# Patient Record
Sex: MALE | Race: BLACK OR AFRICAN AMERICAN | Employment: FULL TIME | URBAN - METROPOLITAN AREA
[De-identification: names, ages, dates, MRNs, and addresses within clinical notes are randomized per-mention and may not be internally consistent; named-entity substitution may affect disease eponyms.]

---

## 2018-07-09 ENCOUNTER — HOSPITAL ENCOUNTER (OUTPATIENT)
Dept: PHYSICAL THERAPY | Age: 23
Discharge: HOME | End: 2018-07-09
Payer: COMMERCIAL

## 2018-07-09 PROCEDURE — 97161 PT EVAL LOW COMPLEX 20 MIN: CPT

## 2018-07-09 PROCEDURE — 97110 THERAPEUTIC EXERCISES: CPT
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
## 2018-07-12 ENCOUNTER — HOSPITAL ENCOUNTER (OUTPATIENT)
Dept: PHYSICAL THERAPY | Age: 23
Discharge: HOME | End: 2018-07-12
Payer: COMMERCIAL

## 2018-07-12 PROCEDURE — 97110 THERAPEUTIC EXERCISES: CPT

## 2018-07-12 NOTE — PROGRESS NOTES
PHYSICAL THERAPY - DAILY TREATMENT NOTE    Patient Name: Jina Payne        Date: 2018  : 1995   YES Patient  Verified  Visit #:   2   of     Insurance: Payor: Vivek Conner / Plan: Jose Lind PPO / Product Type: PPO /      In time: 835 Out time: 035   Total Treatment Time: 40     Medicare Time Tracking (below)   Total Timed Codes (min):   1:1 Treatment Time:       TREATMENT AREA = Low back pain [M54.5]  Neck pain [M54.2]    SUBJECTIVE  Pain Level (on 0 to 10 scale):  7  / 10   Medication Changes/New allergies or changes in medical history, any new surgeries or procedures? NO    If yes, update Summary List   Subjective Functional Status/Changes:  []  No changes reported     Stiff. About the same. Heat helped last visit        OBJECTIVE    30 min Therapeutic Exercise:  [x]  See flow sheet   Rationale:      increase ROM and increase strength to improve the patients ability to perform ADLs with less pain         Modalities Rationale:     decrease pain and increase tissue extensibility to improve patient's ability to perform ADLs   min [] Estim, type/location:                                      []  att     []  unatt     []  w/US     []  w/ice    []  w/heat    min []  Mechanical Traction: type/lbs                   []  pro   []  sup   []  int   []  cont    []  before manual    []  after manual    min []  Ultrasound, settings/location:      min []  Iontophoresis w/ dexamethasone, location:                                               []  take home patch       []  in clinic   10 min []  Ice     []  Heat    location/position:     min []  Vasopneumatic Device, press/temp:     min []  Other:    [x] Skin assessment post-treatment (if applicable):    [x]  intact    [x]  redness- no adverse reaction     []redness  adverse reaction:         min Gait Training:    Rationale:        throughout Rx min Patient Education:  YES  Reviewed HEP   []  Progressed/Changed HEP based on:            Other Objective/Functional Measures:    Progressed HEP     Post Treatment Pain Level (on 0 to 10) scale:   6  / 10     ASSESSMENT  Assessment/Changes in Function:     Functional improvement:  Progressed HEP   Functional limitation:  pain      []  See Progress Note/Recertification   Patient will continue to benefit from skilled PT services to modify and progress therapeutic interventions, address functional mobility deficits, address ROM deficits, address strength deficits, analyze and address soft tissue restrictions and analyze and cue movement patterns to attain remaining goals.    Progress toward goals / Updated goals:    Progressed HEP     PLAN  [x]  Upgrade activities as tolerated YES Continue plan of care   []  Discharge due to :    []  Other:      Therapist: Zohra Kern, PT    Date: 7/12/2018 Time: 8:43 AM     Future Appointments  Date Time Provider John Cope   7/16/2018 8:30 AM Fabi Fairbanks V PTA REHAB CENTER AT [facility]   7/18/2018 9:00 AM Alda Houston PT REHAB CENTER AT [facility]   7/23/2018 8:30 AM Juana Brittle Jared Redwood, PTA REHAB CENTER AT [facility]   7/25/2018 9:00 AM Alda Houston PT REHAB CENTER AT [facility]   7/30/2018 8:30 AM Fabi Fairbanks V PTA REHAB CENTER AT [facility]

## 2018-07-17 ENCOUNTER — HOSPITAL ENCOUNTER (OUTPATIENT)
Dept: PHYSICAL THERAPY | Age: 23
Discharge: HOME | End: 2018-07-17
Payer: COMMERCIAL

## 2018-07-17 PROCEDURE — 97110 THERAPEUTIC EXERCISES: CPT

## 2018-07-17 NOTE — PROGRESS NOTES
PHYSICAL THERAPY - DAILY TREATMENT NOTE      Patient Name: Heather Madrid        Date: 2018  : 1995   YES Patient  Verified  Visit #:   3   of    Insurance: Payor: Elzbieta Screws / Plan: [place] [employer] [place]PO / Product Type: PPO /      In time: 7:00 Out time: 7:36   Total Treatment Time: 26       TREATMENT AREA = Low back pain [M54.5]  Neck pain [M54.2]    SUBJECTIVE    Pain Level (on 0 to 10 scale):  4-5  / 10   Medication Changes/New allergies or changes in medical history, any new surgeries or procedures? NO    If yes, update Summary List   Subjective Functional Status/Changes:  []  No changes reported     Only have LBP, not as bad today as it was last session       OBJECTIVE    26 min Therapeutic Exercise:  [x]  See flow sheet   Rationale:      increase ROM and increase strength to improve the patients ability to perform all LE ADL's     Modalities Rationale:     decrease pain to improve patient's ability to perform all LE ADL's      min [] Estim, type/location:                                      []  att     []  unatt     []  w/US     []  w/ice    []  w/heat    min []  Mechanical Traction: type/lbs                   []  pro   []  sup   []  int   []  cont    []  before manual    []  after manual    min []  Ultrasound, settings/location:      min []  Iontophoresis w/ dexamethasone, location:                                               []  take home patch       []  in clinic   10 min []  Ice     [x]  Heat    location/position: Neck and low back/supine    min []  Vasopneumatic Device, press/temp:     min []  Other:    [x] Skin assessment post-treatment (if applicable):    [x]  intact    [x]  redness- no adverse reaction     []redness  adverse reaction:      Throughout Tx min Patient Education:  YES  Reviewed HEP   []  Progressed/Changed HEP based on: Other Objective/Functional Measures:     Added dead bugs     Post Treatment Pain Level (on 0 to 10) scale:   4  / 10 ASSESSMENT    Assessment/Changes in Function:     No increased pain with progression of therex     []  See Progress Note/Recertification   Patient will continue to benefit from skilled PT services to modify and progress therapeutic interventions, address functional mobility deficits, address ROM deficits, address strength deficits, analyze and address soft tissue restrictions, analyze and cue movement patterns, analyze and modify body mechanics/ergonomics and assess and modify postural abnormalities to attain remaining goals.    Progress toward goals / Updated goals:    Continue strengthening to meet all goals     PLAN    [x]  Upgrade activities as tolerated YES Continue plan of care   []  Discharge due to :    []  Other:      Therapist: Toma Christine PT    Date: 7/17/2018 Time: 7:06 AM   Future Appointments  Date Time Provider John Cope   7/18/2018 9:00 AM Jose Angel Floyd, PT REHAB CENTER AT [facility]   7/23/2018 8:30 AM Gold Glover REH[facility] AT [facility]   7/25/2018 9:00 AM Jose Angel Floyd PT REHAB CENTER AT [facility]   7/30/2018 8:30 AM Gold Moore PTA REHAB CENTER AT [facility]

## 2018-07-18 ENCOUNTER — HOSPITAL ENCOUNTER (OUTPATIENT)
Dept: PHYSICAL THERAPY | Age: 23
Discharge: HOME | End: 2018-07-18
Payer: COMMERCIAL

## 2018-07-18 PROCEDURE — 97110 THERAPEUTIC EXERCISES: CPT

## 2018-07-18 NOTE — PROGRESS NOTES
PHYSICAL THERAPY - DAILY TREATMENT NOTE    Patient Name: Magaly Dillon        Date: 2018  : 1995   YES Patient  Verified  Visit #:   4     Insurance: Payor: Aurora Brooks / Plan: [place] [employer] [place]PO / Product Type: PPO /      In time: 855 Out time: 1000   Total Treatment Time: 65     Medicare Time Tracking (below)   Total Timed Codes (min):   1:1 Treatment Time:       TREATMENT AREA =  Low back pain [M54.5]  Neck pain [M54.2]    SUBJECTIVE  Pain Level (on 0 to 10 scale):  4  / 10   Medication Changes/New allergies or changes in medical history, any new surgeries or procedures? NO    If yes, update Summary List   Subjective Functional Status/Changes:  []  No changes reported     \"I'm doing better. I still feel pain in the center of my back. \"          OBJECTIVE  Modalities Rationale:     decrease pain and increase tissue extensibility to improve patient's ability to tolerate ADL's without increased pain   min [] Estim, type/location:                                      []  att     []  unatt     []  w/US     []  w/ice    []  w/heat    min []  Mechanical Traction: type/lbs                   []  pro   []  sup   []  int   []  cont    []  before manual    []  after manual    min []  Ultrasound, settings/location:      min []  Iontophoresis w/ dexamethasone, location:                                               []  take home patch       []  in clinic   10 min []  Ice     [x]  Heat    location/position: Supine to C/S and L/S    min []  Vasopneumatic Device, press/temp:     min []  Other:    [x] Skin assessment post-treatment (if applicable):    [x]  intact    [x]  redness- no adverse reaction     []redness  adverse reaction:        55 min Therapeutic Exercise:  [x]  See flow sheet   Rationale:      increase ROM and increase strength to improve the patients ability to perform ADL's and work related activities       X min Patient Education:  YES  Reviewed HEP   []  Progressed/Changed HEP based on: Ongoing throughout treatment session. Discussed proper L/S in sitting and body mechanics w/ ADL's     Other Objective/Functional Measures: Added scap retraction and extension with abd draw, wall trish w/ chin tuck, bike, bridging w/ abd draw, hip hinge. Post Treatment Pain Level (on 0 to 10) scale:   0  / 10     ASSESSMENT  Assessment/Changes in Function:     Significant improvement in pain post treatment session. Patient able to perform all therex without pain but required multiple vc's for technique. Pt tends to recruit UT w/ chin tucks and shoulder movement. Fatigued easily but without increased symptoms. Significant loss of muscle strength throughout scapular complex and back musculature. Would benefit from strong posterior therex program to resolve pain and prevent re injury when returning to workouts. []  See Progress Note/Recertification   Patient will continue to benefit from skilled PT services to modify and progress therapeutic interventions, address functional mobility deficits, address ROM deficits, address strength deficits, analyze and address soft tissue restrictions, analyze and cue movement patterns, analyze and modify body mechanics/ergonomics and assess and modify postural abnormalities to attain remaining goals.    Progress toward goals / Updated goals:    Ongoing progress towards all STG's     PLAN  [x]  Upgrade activities as tolerated YES Continue plan of care   []  Discharge due to :    []  Other:      Therapist: Lamin Lewis PT    Date: 7/18/2018 Time: 8:21 AM     Future Appointments  Date Time Provider John Cope   7/18/2018 9:00 AM Lamin Lewis PT [facility]   7/23/2018 8:30 AM Eliz Syed [facility]   7/25/2018 9:00 AM Lamin Lewis PT [facility] AT [facility]   7/30/2018 8:30 AM Eliz Duffy PTA REHAB CENTER AT [facility]

## 2018-07-23 ENCOUNTER — HOSPITAL ENCOUNTER (OUTPATIENT)
Dept: PHYSICAL THERAPY | Age: 23
End: 2018-07-23
Payer: COMMERCIAL

## 2018-07-23 ENCOUNTER — HOSPITAL ENCOUNTER (OUTPATIENT)
Dept: PHYSICAL THERAPY | Age: 23
Discharge: HOME | End: 2018-07-23
Payer: COMMERCIAL

## 2018-07-23 PROCEDURE — 97110 THERAPEUTIC EXERCISES: CPT

## 2018-07-23 NOTE — PROGRESS NOTES
PHYSICAL THERAPY - DAILY TREATMENT NOTE  -    Patient Name: Jing Perez        Date: 2018  : 1995   YES Patient  Verified  Visit #:     Insurance: Payor: Ronda Butt / Plan: [place] [employer] [place] / Product Type: PPO /      In time: 6:05 Out time: 6:45   Total Treatment Time: 35       TREATMENT AREA = Low back pain [M54.5]  Neck pain [M54.2]    SUBJECTIVE    Pain Level (on 0 to 10 scale):  3- 10   Medication Changes/New allergies or changes in medical history, any new surgeries or procedures? NO    If yes, update Summary List   Subjective Functional Status/Changes:  []  No changes reported     Neck feels good, still have some pain in low back       OBJECTIVE    35 min Therapeutic Exercise:  [x]  See flow sheet   Rationale:      increase strength to improve the patients ability to perform all ADL's     Modalities Rationale:     decrease pain to improve patient's ability to perform all ADL's      min [] Estim, type/location:                                      []  att     []  unatt     []  w/US     []  w/ice    []  w/heat    min []  Mechanical Traction: type/lbs                   []  pro   []  sup   []  int   []  cont    []  before manual    []  after manual    min []  Ultrasound, settings/location:      min []  Iontophoresis w/ dexamethasone, location:                                               []  take home patch       []  in clinic   5 min []  Ice     [x]  Heat    location/position: Neck and low back/supine    min []  Vasopneumatic Device, press/temp:     min []  Other:    [x] Skin assessment post-treatment (if applicable):    [x]  intact    [x]  redness- no adverse reaction     []redness  adverse reaction:      Throughout Tx min Patient Education:  YES  Reviewed HEP   []  Progressed/Changed HEP based on: Other Objective/Functional Measures:     Tolerated and completed all therex     Post Treatment Pain Level (on 0 to 10) scale:    10     ASSESSMENT    Assessment/Changes in Function:     No increased pain during therex     []  See Progress Note/Recertification   Patient will continue to benefit from skilled PT services to modify and progress therapeutic interventions, address functional mobility deficits, address ROM deficits, address strength deficits, analyze and address soft tissue restrictions, analyze and cue movement patterns, analyze and modify body mechanics/ergonomics and assess and modify postural abnormalities to attain remaining goals.    Progress toward goals / Updated goals:    Continue strengthening to meet all goals     PLAN    [x]  Upgrade activities as tolerated YES Continue plan of care   []  Discharge due to :    []  Other:      Therapist: Julia Abraham PT    Date: 7/23/2018 Time: 6:41 PM   Future Appointments  Date Time Provider John Cope   7/25/2018 6:00 PM Kaleigh Chaney RE[facility] AT [facility]   8/1/2018 6:00 PM [facility] PT [place] [id number] REHAB CENTER AT [facility]   8/3/2018 7:30 AM Concha HANCOCK PTA REHAB CENTER AT [facility]

## 2018-07-25 ENCOUNTER — APPOINTMENT (OUTPATIENT)
Dept: PHYSICAL THERAPY | Age: 23
End: 2018-07-25
Payer: COMMERCIAL

## 2018-07-27 ENCOUNTER — HOSPITAL ENCOUNTER (OUTPATIENT)
Dept: PHYSICAL THERAPY | Age: 23
Discharge: HOME | End: 2018-07-27
Payer: COMMERCIAL

## 2018-07-27 PROCEDURE — 97110 THERAPEUTIC EXERCISES: CPT

## 2018-07-27 NOTE — PROGRESS NOTES
PHYSICAL THERAPY - DAILY TREATMENT NOTE   Patient Name: Anisa Lawton        Date: 2018 : 1995   YES Patient  Verified Visit #:     Insurance: Payor: Justyna Hilliard / Plan: [place] [employer] PPO / Product Type: PPO / In time: 9:05 Out time: 9:55 Total Treatment Time: 50 TREATMENT AREA = Low back pain [M54.5] Neck pain [M54.2] SUBJECTIVE Pain Level (on 0 to 10 scale):  3  10 Medication Changes/New allergies or changes in medical history, any new surgeries or procedures? NO    If yes, update Summary List  
Subjective Functional Status/Changes:  []  No changes reported \"Pain with lifting and bending. Sleeping okay. No neck pain. \" OBJECTIVE Modalities Rationale:     decrease pain to improve patient's ability to perform all ADL's    
 min [] Estim, type/location:    
                                 []  att     []  unatt     []  w/US     []  w/ice    []  w/heat  
 min []  Mechanical Traction: type/lbs   
               []  pro   []  sup   []  int   []  cont    []  before manual    []  after manual  
 min []  Ultrasound, settings/location:    
 min []  Iontophoresis w/ dexamethasone, location:   
                                           []  take home patch       []  in clinic  
10 min [x]  Ice     []  Heat    location/position: To L/S  
 min []  Vasopneumatic Device, press/temp:   
 min []  Other:   
[x] Skin assessment post-treatment (if applicable):   
[x]  intact    [x]  redness- no adverse reaction    
[]redness  adverse reaction:   
 
40 min Therapeutic Exercise:  [x]  See flow sheet Rationale:      increase strength to improve the patients ability to perform all ADL's X min Patient Education:  YES  Reviewed HEP []  Progressed/Changed HEP based on: Other Objective/Functional Measures: Added TA draw with SLR. Pain with bridge. Pt wanted trial of ice over [address]; prefers .   
  
Post Treatment Pain Level (on 0 to 10) scale:   3/ 10 ASSESSMENT Assessment/Changes in Function:  
 
Functional Limitations: lifting and bending Functional Improvements: Driving forwork 
  
[]  See Progress Note/Recertification Patient will continue to benefit from skilled PT services to modify and progress therapeutic interventions, address functional mobility deficits, address ROM deficits, address strength deficits, analyze and address soft tissue restrictions, analyze and cue movement patterns, analyze and modify body mechanics/ergonomics and assess and modify postural abnormalities to attain remaining goals. Progress toward goals / Updated goals: 
 
Continue strengthening to meet all goals PLAN [x]  Upgrade activities as tolerated YES Continue plan of care  
[]  Discharge due to :   
[]  Other:   
 
Therapist: Aliza Matos PTA Date: 7/27/2018 Time: 6:41 PM  
 
Future Appointments Date Time Provider John Cope 7/27/2018 9:00 AM Mary Rodríguez PTA REH[facility]ENTER AT [facility]  
8/1/2018 6:00 PM [facility] PT [place] [address] [facility] AT [facility]  
8/3/2018 7:30 AM Cameron HANCOCK PTA [facility] AT [facility]

## 2018-07-30 ENCOUNTER — APPOINTMENT (OUTPATIENT)
Dept: PHYSICAL THERAPY | Age: 23
End: 2018-07-30
Payer: COMMERCIAL

## 2018-08-01 ENCOUNTER — HOSPITAL ENCOUNTER (OUTPATIENT)
Dept: PHYSICAL THERAPY | Age: 23
Discharge: HOME | End: 2018-08-01
Payer: COMMERCIAL

## 2018-08-01 PROCEDURE — 97110 THERAPEUTIC EXERCISES: CPT

## 2018-08-01 NOTE — PROGRESS NOTES
PHYSICAL THERAPY - DAILY TREATMENT NOTE Patient Name: Demetri Risk        Date: 2018 : 1995    Patient  Verified: Ligia Edmonds Visit #:     Insurance: Payor: Cordell Travis / Plan: [place] [employer] PPO / Product Type: PPO / In time: 6:05 Out time: 7:00 Total Treatment Time: 54 Medicare Time Tracking (below) Total Timed Codes (min):  - 1:1 Treatment Time:  -  
 
TREATMENT AREA/ DIAGNOSIS = Low back pain [M54.5] Neck pain [M54.2] SUBJECTIVE Pain Level (on 0 to 10 scale):  3  / 10 Medication Changes/New allergies or changes in medical history, any new surgeries or procedures? NO    If yes, update Summary List  
Subjective Functional Status/Changes:  []  No changes reported Pt reports that he is still having some pain when he is bending forward OBJECTIVE 55 min Therapeutic Exercise:  [x]  See flow sheet Rationale:      increase strength, improve coordination and improve balance to improve the patients ability to perform ADLs without pain Throughout treatment session min Patient Education:  Yes    [x] Reviewed HEP []  Progressed/Changed HEP based on: Other Objective/Functional Measures: 
 
Pt has full ROM of his L/s. Performed 90/90 hs tretch instead of straight leg raise d/t straight leg raise causing neural tension in the LEs Post Treatment Pain Level (on 0 to 10) scale:   2-3  / 10 ASSESSMENT Assessment/Changes in Function:  
 
Pt required verbal and tactile cueing for proper core activation during stabilization exercises. []  See Progress Note/Recertification Patient will continue to benefit from skilled PT services to modify and progress therapeutic interventions, address functional mobility deficits, address strength deficits and analyze and modify body mechanics/ergonomics to attain remaining goals. Progress toward goals / Updated goals: 
 
Continue with current treatment plan PLAN [x]  Upgrade activities as tolerated YES Continue plan of care  
[]  Discharge due to :   
[]  Other:   
 
Therapist: Katherin Turner Date: 8/1/2018 Time: 6:14 PM  
 
  
Future Appointments Date Time Provider John Cope 8/3/2018 7:30 AM Sushila HANCOCK PTA REHAB [facility] AT [facility]

## 2018-08-13 ENCOUNTER — HOSPITAL ENCOUNTER (OUTPATIENT)
Dept: PHYSICAL THERAPY | Age: 23
End: 2018-08-13
Payer: COMMERCIAL

## 2018-08-17 ENCOUNTER — HOSPITAL ENCOUNTER (OUTPATIENT)
Dept: PHYSICAL THERAPY | Age: 23
Discharge: HOME | End: 2018-08-17
Payer: COMMERCIAL

## 2018-08-17 PROCEDURE — 97110 THERAPEUTIC EXERCISES: CPT

## 2018-08-17 NOTE — PROGRESS NOTES
[address] PHYSICAL THERAPY AT [address], [address] [place] [address], [address], [address] [place] [address]  Phone: [phone]  Fax: [phone]  PROGRESS NOTE  Patient Name: Alberto Marie : 1995   Treatment/Medical Diagnosis: Low back pain [M54.5]  Neck pain [M54.2]   Referral Source: Aga Sutherland, *     Date of Initial Visit: 18 Attended Visits: 8 Missed Visits: 4     SUMMARY OF TREATMENT   Alberto Marie has been seen at our clinic 2x/wk for a total of 8visits. Pt treatment has consisted of therapeutic exercise for deep cervical flexor and spinal stabilizer mm strengthening, postural correction, and functional squat education. CURRENT STATUS   Pt has had a good response to physical therapy treatment. Pt reports 85% overall improvement since [facility]. Reaching, leaning over, turning head, and driving are easier to do. Continued pain/diffiuclty with reaching and leaning over. Pain at best: 2/10, pain at worst: 3/10. Bridge: 100%   Previous Goals:  1. Pt will improve FOTO score to >/= 77 to demo a significant improvement in functional activity tolerance. 2. Pt will demonstrate bridge >/= 75% for improved axial stability with standing/walking. 3. Pt will note pain less than or equal to 2/10 to promote return to work activities. Prior Level/Current Level:  1) Prior Level: 56   Current Level: 67   Goal Met? no  2) Prior Level: NT   Current Level: 100%   Goal Met? yes  3) Prior Level: 7/10   Current Level: 3/10   Goal Met? progressing    New Goals to be achieved in __4__  treatments:  1. Pt will improve FOTO score to >/= 77 to demo a significant improvement in functional activity tolerance. 2. Pt will note pain less than or equal to 2/10 to promote return to work activities. RECOMMENDATIONS   Continue therapy 2x/week for another 4 treatment session. If you have any questions/comments please contact us directly at [phone].    Thank you for allowing us to assist in the care of your patient. Therapist Signature: Adalberto Romberg, PT Date: 8/17/2018     Time: 8:06 AM   NOTE TO PHYSICIAN:  PLEASE COMPLETE THE ORDERS BELOW AND FAX TO   [facility]: [phone]. If you are unable to process this request in 24 hours please contact our office: [phone].    ___ I have read the above report and request that my patient continue as recommended.   ___ I have read the above report and request that my patient continue therapy with the following changes/special instructions:_________________________________________________________   ___ I have read the above report and request that my patient be discharged from therapy.

## 2018-08-17 NOTE — PROGRESS NOTES
PHYSICAL THERAPY - DAILY TREATMENT NOTE      Patient Name: Jovanny Sotelo        Date: 2018  : 1995   YES Patient  Verified  Visit #:     Insurance: Payor: Olya Gooden / Plan: Alexa Conception PPO / Product Type: PPO /      In time: 8:05 Out time: 8:50   Total Treatment Time: 45       TREATMENT AREA = Low back pain [M54.5]  Neck pain [M54.2]    SUBJECTIVE    Pain Level (on 0 to 10 scale):  0  / 10   Medication Changes/New allergies or changes in medical history, any new surgeries or procedures? NO    If yes, update Summary List   Subjective Functional Status/Changes:  []  No changes reported     See progress report       OBJECTIVE    45 min Therapeutic Exercise:  [x]  See flow sheet   Rationale:      increase ROM and increase strength to improve the patients ability to perform all ADL's     Throughout Tx min Patient Education:  YES  Reviewed HEP   []  Progressed/Changed HEP based on: Other Objective/Functional Measures:    See progress report     Post Treatment Pain Level (on 0 to 10) scale:   0  / 10     ASSESSMENT    Assessment/Changes in Function:     See progress report     []  See Progress Note/Recertification   Patient will continue to benefit from skilled PT services to modify and progress therapeutic interventions, address functional mobility deficits, address strength deficits, analyze and cue movement patterns, analyze and modify body mechanics/ergonomics and assess and modify postural abnormalities to attain remaining goals. Progress toward goals / Updated goals:    See progress report     PLAN    [x]  Upgrade activities as tolerated YES Continue plan of care   []  Discharge due to :    []  Other:      Therapist: Jadiel Clark, PT    Date: 2018 Time: 8:23 AM   No future appointments.

## 2018-08-20 ENCOUNTER — HOSPITAL ENCOUNTER (OUTPATIENT)
Dept: PHYSICAL THERAPY | Age: 23
Discharge: HOME | End: 2018-08-20
Payer: COMMERCIAL

## 2018-08-20 PROCEDURE — 97110 THERAPEUTIC EXERCISES: CPT

## 2018-08-20 NOTE — PROGRESS NOTES
PHYSICAL THERAPY - DAILY TREATMENT NOTE      Patient Name: Hai Salcedo        Date: 2018  : 1995   YES Patient  Verified  Visit #:     Insurance: Payor: Katie Comment / Plan: [place] [employer] PPO / Product Type: PPO /      In time: 6:00 Out time: 6:38   Total Treatment Time: 38       TREATMENT AREA = Low back pain [M54.5]  Neck pain [M54.2]    SUBJECTIVE    Pain Level (on 0 to 10 scale):  0  / 10   Medication Changes/New allergies or changes in medical history, any new surgeries or procedures? NO    If yes, update Summary List   Subjective Functional Status/Changes:  []  No changes reported     No pain today       OBJECTIVE    38 min Therapeutic Exercise:  [x]  See flow sheet   Rationale:      increase ROM and increase strength to improve the patients ability to perform all LE ADL's    Throughout Tx min Patient Education:  YES  Reviewed HEP   []  Progressed/Changed HEP based on: Other Objective/Functional Measures: Tolerated and completed all therex     Post Treatment Pain Level (on 0 to 10) scale:   0  / 10     ASSESSMENT    Assessment/Changes in Function:     No increased pain during therex       []  See Progress Note/Recertification   Patient will continue to benefit from skilled PT services to modify and progress therapeutic interventions, address functional mobility deficits, address strength deficits, analyze and cue movement patterns, analyze and modify body mechanics/ergonomics and assess and modify postural abnormalities to attain remaining goals.    Progress toward goals / Updated goals:    D/C PT next visit if pain-free     PLAN    [x]  Upgrade activities as tolerated YES Continue plan of care   []  Discharge due to :    []  Other:      Therapist: Mago Viveros PT    Date: 2018 Time: 6:03 PM   Future Appointments  Date Time Provider John Cope   2018 8:00 AM Gladys HANCOCK PTA REHAB [facility] AT [facility]

## 2018-09-24 NOTE — PROGRESS NOTES
Margarita Suarezkibautista Germain [facility] PHYSICAL THERAPY AT [address] [facility], [address] [place] [address], [address] [place] [address], [address] [place] [address]  Phone: [phone]  Fax: [phone] DISCHARGE SUMMARY Patient Name: Jocy Joel : 1995 Treatment/Medical Diagnosis: Low back pain [M54.5] Neck pain [M54.2] Referral Source: Yolanda Emery, * Date of Initial Visit: 18 Attended Visits: 9 Missed Visits: 5 SUMMARY OF TREATMENT Jocy Joel has been seen at our clinic 2x/wk for a total of 9 visits. Pt treatment has consisted of therapeutic exercise for deep spinal stabilizer, deep cervical flexor, and pelvic girdle mm strengthening, postural correction, functional squat education, and manual therapy. CURRENT STATUS Unknown secondary to Pt non-compliance with PT attendance GOAL STATUS Unable to formally reassess progress toward goals secondary to pt non-compliance with continued PT attendance>1 month (last visit 18). RECOMMENDATIONS Discontinue therapy due to lack of attendance or compliance. If you have any questions/comments please contact us directly at [phone]. Thank you for allowing us to assist in the care of your patient. Therapist Signature: Sharla Landau, PT Date: 18   Time: 3:40 PM

## 2019-04-29 NOTE — PROGRESS NOTES
PHYSICAL THERAPY - DAILY TREATMENT NOTE    Patient Name: Meka Mcmullen        Date: 2018  : 1995   yes Patient  Verified  Visit #:   1     Insurance: Payor: BLUE CROSS MEDICAID / Plan: Joshua Lagunas / Product Type: Managed Care Medicaid /      In time: 8:05 Out time: 8:55   Total Treatment Time: 50     Medicare/BCBS Time Tracking (below)   Total Timed Codes (min):  n/a 1:1 Treatment Time:  n/a     TREATMENT AREA =  Low back pain [M54.5]  Neck pain [M54.2]    SUBJECTIVE  Pain Level (on 0 to 10 scale):  7  / 10   Medication Changes/New allergies or changes in medical history, any new surgeries or procedures?    no  If yes, update Summary List   Subjective Functional Status/Changes:  []  No changes reported     See POC          OBJECTIVE    See exam on chart for details on objective findings      Modalities Rationale:     decrease pain and increase tissue extensibility to improve patient's ability to perform functional mobility/ADLs and attain goals.    min [] Estim, type/location:                                      []  att     []  unatt     []  w/US     []  w/ice    []  w/heat    min []  Mechanical Traction: type/lbs                   []  pro   []  sup   []  int   []  cont    []  before manual    []  after manual    min []  Ultrasound, settings/location:      min []  Iontophoresis w/ dexamethasone, location:                                               []  take home patch       []  in clinic   10 min []  Ice     [x]  Heat    location/position: Neck and low back; reclined with LE wedge    min []  Vasopneumatic Device, press/temp:     min []  Other:    [x] Skin assessment post-treatment (if applicable):    [x]  intact    []  redness- no adverse reaction     []redness  adverse reaction:        10 min Therapeutic Exercise:  [x]  See flow sheet   Rationale:      increase ROM and increase strength to improve the patients ability to perform functional mobility/ADLs and attain goals.          min Patient Education:  yes  Reviewed HEP   []  Progressed/Changed HEP based on: Other Objective/Functional Measures:    See POC     Post Treatment Pain Level (on 0 to 10) scale:   6  / 10     ASSESSMENT  Assessment/Changes in Function:     See POC     []  See Progress Note/Recertification   Patient will continue to benefit from skilled PT services to modify and progress therapeutic interventions, address functional mobility deficits, address ROM deficits, address strength deficits and analyze and address soft tissue restrictions to attain remaining goals. Progress toward goals / Updated goals:    See POC     PLAN  []  Upgrade activities as tolerated yes Continue plan of care   []  Discharge due to :    []  Other:      Therapist: Gerardo Valdez.  Gardenia Alex, PT    Date: 7/9/2018 Time: 7:36 AM     Future Appointments  Date Time Provider John Cope   7/12/2018 8:30 AM Rosa Smith PT REHAB CENTER AT [facility]   7/16/2018 8:30 AM Silas Theodore Smaller REHAB CENTER AT [facility]   7/18/2018 9:00 AM Charito Donis PT REHAB CENTER AT [facility]   7/23/2018 8:30 AM Ki Morel PTA REHAB CENTER AT [facility]   7/25/2018 9:00 AM Charito Donis PT REHAB CENTER AT [facility]   7/30/2018 8:30 AM Hien Pastor V PTA REHAB CENTER AT [facility] day(s)/3

## 2022-04-27 ENCOUNTER — HOSPITAL ENCOUNTER (OUTPATIENT)
Dept: PHYSICAL THERAPY | Age: 27
Discharge: HOME | End: 2022-04-27
Payer: COMMERCIAL

## 2022-04-27 PROCEDURE — 97162 PT EVAL MOD COMPLEX 30 MIN: CPT
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
## 2022-04-27 NOTE — PROGRESS NOTES
PHYSICAL THERAPY - DAILY TREATMENT NOTE     Patient Name: Lance Santana        Date: 2022  : 1995   YES Patient  Verified  Visit #:      12  Insurance: Payor: Rosalia Syed / Plan: [place] [employer] [place] / Product Type: PPO /      In time: 11:24 AM Out time: 12:23 PM   Total Treatment Time: 59 min. Medicare/[employer] Time Tracking (below)   Total Timed Codes (min):  NA 1:1 Treatment Time:  NA     TREATMENT AREA =  Pain in left leg [M79.605]    SUBJECTIVE    Pain Level (on 0 to 10 scale):  3-4 / 10   Medication Changes/New allergies or changes in medical history, any new surgeries or procedures? NO    If yes, update Summary List   Subjective Functional Status/Changes:  []  No changes reported     See POC         OBJECTIVE  Modalities Rationale:     decrease edema, decrease inflammation, decrease pain, increase tissue extensibility and increase muscle contraction/control to improve patient's ability to transfer, stand, walk, community mobility, ADLs, work, household chores, sleep, and recreation/fitness witih less/no pain.    min [] Estim, type/location:                                      []  att     []  unatt     []  w/US     []  w/ice    []  w/heat    min []  Mechanical Traction: type/lbs                   []  pro   []  sup   []  int   []  cont    []  before manual    []  after manual    min []  Ultrasound, settings/location:      min []  Iontophoresis w/ dexamethasone, location:                                               []  take home patch       []  in clinic    min []  Ice     []  Heat    location/position:     min []  Vasopneumatic Device, press/temp:     min []  Other:    [] Skin assessment post-treatment (if applicable):    []  intact    []  redness- no adverse reaction     []redness - adverse reaction:      15 min Therapeutic Exercise:  [x]  See flow sheet   Rationale:      increase ROM and increase strength to improve the patients ability to transfer, stand, walk, community mobility, ADLs, work, household chores, sleep, and recreation/fitness witih less/no pain. 0 min Therapeutic Activity: [x]  See flow sheet   Rationale:      increase ROM, increase strength, improve coordination, improve balance and increase proprioception to improve the patients ability to transfer, stand, walk, community mobility, ADLs, work, household chores, sleep, and recreation/fitness witih less/no pain. 0 min Neuromuscular Re-ed: [x]  See flow sheet   Rationale:      increase strength, improve coordination, improve balance and increase proprioception to improve the patients ability to transfer, stand, walk, community mobility, ADLs, work, household chores, sleep, and recreation/fitness witih less/no pain. 0 min Manual Therapy: Technique:      [] S/DTM []IASTM []PROM [] Passive Stretching   []manual TPR    []Jt manipulation:Gr I [] II []  III [] IV[] V[]  Treatment Area:     Rationale:      decrease pain, increase ROM, increase tissue extensibility, decrease trigger points and increase postural awareness to improve patient's ability to transfer, stand, walk, community mobility, ADLs, work, household chores, sleep, and recreation/fitness witih less/no pain. The manual therapy interventions were performed at a separate and distinct time from the therapeutic activities interventions. 0 min Gait Training:  ___ feet with ___ device on level surfaces with ___ level of assistance   Rationale: To improve ambulation safety and efficiency in order to improve patient's ability to safely ambulate at home for self care. 0 min Self Care:    Rationale:    increase ROM, increase strength, improve coordination, improve balance and increase proprioception to improve the patients ability to transfer, stand, walk, community mobility, ADLs, work, household chores, sleep, and recreation/fitness witih less/no pain.     Billed With/As:   [x] TE   [x] TA   [x] Neuro   [x] Self Care Patient Education: [x] Review HEP    [x] Progressed/Changed HEP based on:   [x] positioning   [x] body mechanics   [] transfers   [] heat/ice application    [x] other:  Patient instructed in and briefly performed beginning HEP. Patient given handouts with pictures and written directions for HEP; copies of handouts placed in patient's chart. Other Objective/Functional Measures:    See POC     Post Treatment Pain Level (on 0 to 10) scale:   ? / 10--Not Reassessed. ASSESSMENT    Assessment/Changes in Function:     See POC     []  See Progress Note/Recertification   Patient will continue to benefit from skilled PT services to modify and progress therapeutic interventions, address functional mobility deficits, address ROM deficits, address strength deficits, analyze and address soft tissue restrictions, analyze and cue movement patterns, analyze and modify body mechanics/ergonomics, assess and modify postural abnormalities and instruct in home and community integration to attain remaining goals.       Progress toward goals / Updated goals:    See POC       PLAN    [x]  Upgrade activities as tolerated YES Continue plan of care   []  Discharge due to :    []  Other:      Therapist: Hermes Modi PT    Date: 4/27/2022 Time: 12:36 PM     Future Appointments   Date Time Provider John Cope   5/2/2022 10:30 AM Emily Kline PT [facility]   5/4/2022 10:30 AM Emily Kline PT [facility]

## 2022-05-02 ENCOUNTER — HOSPITAL ENCOUNTER (OUTPATIENT)
Dept: PHYSICAL THERAPY | Age: 27
Discharge: HOME | End: 2022-05-02
Payer: COMMERCIAL

## 2022-05-02 PROCEDURE — 97110 THERAPEUTIC EXERCISES: CPT

## 2022-05-02 PROCEDURE — 97035 APP MDLTY 1+ULTRASOUND EA 15: CPT

## 2022-05-02 PROCEDURE — 97530 THERAPEUTIC ACTIVITIES: CPT

## 2022-05-02 NOTE — PROGRESS NOTES
PHYSICAL THERAPY - DAILY TREATMENT NOTE      Patient Name: Chaparro Gibbs                         Date: 2022  : 1995                                    YES Patient  Verified  Visit #:                     Insurance: Payor: Prashant Madrid / Plan: [place] [employer] [place] / Product Type: PPO /       In time: 1:12 PM Out time: 2:25 PM   Total Treatment Time: 73 min.          Medicare/[employer] Time Tracking (below)   Total Timed Codes (min):  NA 1:1 Treatment Time:  NA      TREATMENT AREA =  Pain in left leg [M79.605]     SUBJECTIVE     Pain Level (on 0 to 10 scale):  6 / 10   Medication Changes/New allergies or changes in medical history, any new surgeries or procedures? NO    If yes, update Summary List   Subjective Functional Status/Changes:  []? No changes reported      Pt reports being able to do HEP items from handouts and no problems/questions related to HEP.   Left distal posterior thigh and wound areas . Pt denies drainage from wound sites and he denies signs/symptoms of infection or DVT/PE.         OBJECTIVE  Modalities Rationale:     decrease edema, decrease inflammation, decrease pain, increase tissue extensibility and increase muscle contraction/control to improve patient's ability to transfer, stand, walk, community mobility, ADLs, work, household chores, sleep, and recreation/fitness witih less/no pain.                min []? Estim, type/location:                                                            []?  att     []?  unatt     []?  w/US     []?  w/ice    []?  w/heat     min []? Mechanical Traction: type/lbs                    []?  pro   []?  sup   []? int   []?  cont    []? before manual    []? after manual   8  min [x]? Ultrasound, settings/location:  1.8 W/cm2 @ 1 MHz 100% to left distal posterior thigh/hamstrings in prone.      min []? Iontophoresis w/ dexamethasone, location:                                                []?  take home patch       []?   in clinic  10/10 min [x]? Ice     [x]? Heat    location/position: Large MHP to posterior L thigh and knee pre-Tx/prone. CP to L posterior thigh post-Tx/prone.      min []? Vasopneumatic Device, press/temp:       min []? Other:     [x]? Skin assessment post-treatment (if applicable):    [x]? intact    []? redness- no adverse reaction     []? redness  adverse reaction:       30 min Therapeutic Exercise:  [x]? See flow sheet   Rationale:      increase ROM and increase strength to improve the patients ability to transfer, stand, walk, community mobility, ADLs, work, household chores, sleep, and recreation/fitness witih less/no pain.     15 min Therapeutic Activity: [x]? See flow sheet   Rationale:      increase ROM, increase strength, improve coordination, improve balance and increase proprioception to improve the patients ability to transfer, stand, walk, community mobility, ADLs, work, household chores, sleep, and recreation/fitness witih less/no pain.      0 min Neuromuscular Re-ed: [x]? See flow sheet   Rationale:      increase strength, improve coordination, improve balance and increase proprioception to improve the patients ability to transfer, stand, walk, community mobility, ADLs, work, household chores, sleep, and recreation/fitness witih less/no pain.      0 min Manual Therapy: Technique:      []? S/DTM []? IASTM []? PROM []? Passive Stretching   []?manual TPR    []? Jt manipulation:Gr I []? II []? III []? IV[]? V[]? Treatment Area:     Rationale:      decrease pain, increase ROM, increase tissue extensibility, decrease trigger points and increase postural awareness to improve patient's ability to transfer, stand, walk, community mobility, ADLs, work, household chores, sleep, and recreation/fitness witih less/no pain.   The manual therapy interventions were performed at a separate and distinct time from the therapeutic activities interventions.     0 min Gait Training:  ___ feet with ___ device on level surfaces with ___ level of assistance   Rationale: To improve ambulation safety and efficiency in order to improve patient's ability to safely ambulate at home for self care.     0 min Self Care:     Rationale:    increase ROM, increase strength, improve coordination, improve balance and increase proprioception to improve the patients ability to transfer, stand, walk, community mobility, ADLs, work, household chores, sleep, and recreation/fitness witih less/no pain.     Billed With/As:   [x]? TE   [x]? TA   [x]? Neuro   [x]? Self Care Patient Education: [x]? Review HEP    [x]? Progressed/Changed HEP based on:   [x]? positioning   [x]? body mechanics   []? transfers   []? heat/ice application    [x]? other:  Patient instructed in and briefly performed new HEP items. Pt given handouts with pictures and written directions for new HEP items and copies placed in patient's chart.             Other Objective/Functional Measures:     Pt ambulates into PT carrying crutches and appears to have full L knee extension in standing and in prone.      Post Treatment Pain Level (on 0 to 10) scale:   5 / 10      ASSESSMENT    Assessment/Changes in Function:      Good performance of all activities during PT session and without c/o increased pain; improving HS flexibility. []  See Progress Note/Recertification   Patient will continue to benefit from skilled PT services to modify and progress therapeutic interventions, address functional mobility deficits, address ROM deficits, address strength deficits, analyze and address soft tissue restrictions, analyze and cue movement patterns, analyze and modify body mechanics/ergonomics, assess and modify postural abnormalities and instruct in home and community integration to attain remaining goals.       Progress toward goals / Updated goals:    Good Progress to    [x] STG    [] LTG  1, 3, and 4 as shown by patient reporting and by observed motions and performance of activities during the PT session. PLAN           [x]? Upgrade activities as tolerated YES Continue plan of care   []? Discharge due to :     []?   Other:              Therapist: KEITH Acharya     Date: 05/02/2022 Time: 10:29 AM     Future Appointments   Date Time Provider John Cope   5/2/2022 10:30 AM Kira Hannah, PT [facility]   5/4/2022 10:30 AM Kira Hannah, PT [facility]

## 2022-05-04 ENCOUNTER — APPOINTMENT (OUTPATIENT)
Dept: PHYSICAL THERAPY | Age: 27
End: 2022-05-04
Payer: COMMERCIAL

## 2022-05-05 ENCOUNTER — HOSPITAL ENCOUNTER (OUTPATIENT)
Dept: PHYSICAL THERAPY | Age: 27
Discharge: HOME | End: 2022-05-05
Payer: COMMERCIAL

## 2022-05-05 PROCEDURE — 97110 THERAPEUTIC EXERCISES: CPT

## 2022-05-05 NOTE — PROGRESS NOTES
PHYSICAL THERAPY - DAILY TREATMENT NOTE      Patient Jada Curran                         Date: 2022  : 1995                                    YES Patient  Verified  Visit #:   3   of   12                  Insurance: Payor: Nate Tena / Plan: [place] [employer] [place]PO / Product Type: PPO /       In time: 4:40 PM Out time: 5:45 PM   Total Treatment Time: 65 min.            Medicare/[employer] Time Tracking (below)   Total Timed Codes (min):  NA 1:1 Treatment Time:  NA      TREATMENT AREA =  Pain in left leg [M79.605]     SUBJECTIVE     Pain Level (on 0 to 10 scale):  5 / 10   Medication Changes/New allergies or changes in medical history, any new surgeries or procedures?    NO    If yes, update Summary List   Subjective Functional Status/Changes:  []??  No changes reported      Pt reports OK after last PT visit. Pt relates that he is unable to do toe curling with towel with L foot. Pt has had follow up with his PCP and will see an orthopedist tomorrow.         OBJECTIVE  Modalities Rationale:     decrease edema, decrease inflammation, decrease pain, increase tissue extensibility and increase muscle contraction/control to improve patient's ability to transfer, stand, walk, community mobility, ADLs, work, household chores, sleep, and recreation/fitness witih less/no pain.                          min []? ? Estim, type/location:                                                            []? ?  att     []? ?  unatt     []? ?  w/US     []? ?  w/ice    []? ?  w/heat     min []? ?  Mechanical Traction: type/lbs                    []? ?  pro   []? ?  sup   []? ?  int   []? ?  cont    []? ?  before manual    []? ?  after manual   0  min []? ?  Ultrasound, settings/location:  1.8 W/cm2 @ 1 MHz 100% to left distal posterior thigh/hamstrings in prone.      min []? ?  Iontophoresis w/ dexamethasone, location:                                                []? ?  take home patch       []? ?  in clinic    5 min [x]??  Ice     []? ?  Heat    location/position: CP to L posterior thigh post-Tx/prone.      min []? ?  Vasopneumatic Device, press/temp:       min []? ?  Other:     [x]? ? Skin assessment post-treatment (if applicable):    [x]? ?  intact    []? ?  redness- no adverse reaction     []? ?redness  adverse reaction:       55 min Therapeutic Exercise:  [x]? ?  See flow sheet   Rationale:      increase ROM and increase strength to improve the patients ability to transfer, stand, walk, community mobility, ADLs, work, household chores, sleep, and recreation/fitness witih less/no pain.      min Therapeutic Activity: [x]? ?  See flow sheet   Rationale:      increase ROM, increase strength, improve coordination, improve balance and increase proprioception to improve the patients ability to transfer, stand, walk, community mobility, ADLs, work, household chores, sleep, and recreation/fitness witih less/no pain.      0 min Neuromuscular Re-ed: [x]? ?  See flow sheet   Rationale:      increase strength, improve coordination, improve balance and increase proprioception to improve the patients ability to transfer, stand, walk, community mobility, ADLs, work, household chores, sleep, and recreation/fitness witih less/no pain.      0 min Manual Therapy: Technique:      []?? S/DTM []??IASTM []? ?PROM []? ? Passive Stretching   []? ?manual TPR    []? ?Jt manipulation:Gr I []? ? II []??  III []?? IV[]? ? V[]?? Treatment Area:     Rationale:      decrease pain, increase ROM, increase tissue extensibility, decrease trigger points and increase postural awareness to improve patient's ability to transfer, stand, walk, community mobility, ADLs, work, household chores, sleep, and recreation/fitness witih less/no pain.   The manual therapy interventions were performed at a separate and distinct time from the therapeutic activities interventions.     0 min Gait Training:  ___ feet with ___ device on level surfaces with ___ level of assistance   Rationale: To improve ambulation safety and efficiency in order to improve patient's ability to safely ambulate at home for self care.     5 min Self Care:  foam roller and muscle roller self-DTM to left thigh areas. Rationale:    increase ROM, increase strength, improve coordination, improve balance and increase proprioception to improve the patients ability to transfer, stand, walk, community mobility, ADLs, work, household chores, sleep, and recreation/fitness witih less/no pain.     Billed With/As:   [x]? ? TE   [x]? ? TA   [x]? ? Neuro   [x]? ? Self Care Patient Education: [x]? ? Review HEP    [x]? ? Progressed/Changed HEP based on:   [x]? ? positioning   [x]? ? body mechanics   []? ? transfers   []? ? heat/ice application    [x]? ? other:  Patient instructed in and performed new HEP items. Pt given handouts with pictures and written directions for new HEP items and copies placed in patient's chart.               Other Objective/Functional Measures:     Pt ambulates into PT without AD use, but decreased L knee flexion--keeps knee in exentension with increased toe out. Pt appears to have full L knee extension in standing, supine, and prone.      Post Treatment Pain Level (on 0 to 10) scale:   4 / 10      ASSESSMENT     Assessment/Changes in Function:       Good performance of all activities during PT session and without c/o increased pain; improving HS flexibility and good quad and adductor and gastroc flexibility, but tight and tender left ITB area. .      []? See Progress Note/Recertification   Patient will continue to benefit from skilled PT services to modify and progress therapeutic interventions, address functional mobility deficits, address ROM deficits, address strength deficits, analyze and address soft tissue restrictions, analyze and cue movement patterns, analyze and modify body mechanics/ergonomics, assess and modify postural abnormalities and instruct in home and community integration to attain remaining goals. Progress toward goals / Updated goals:     Good Progress to    [x]? STG    []? LTG  1, 3, and 4 as shown by patient reporting and by observed motions and performance of activities during the PT session.         PLAN               [x]? ?  Upgrade activities as tolerated YES Continue plan of care   []? ?  Discharge due to :     []? ?  Other:                  Therapist: KEITH Stone     Date: 05/05/2022 Time: 10:26 AM

## 2022-05-09 ENCOUNTER — HOSPITAL ENCOUNTER (OUTPATIENT)
Dept: PHYSICAL THERAPY | Age: 27
Discharge: HOME | End: 2022-05-09
Payer: COMMERCIAL

## 2022-05-09 PROCEDURE — 97112 NEUROMUSCULAR REEDUCATION: CPT

## 2022-05-09 PROCEDURE — 97110 THERAPEUTIC EXERCISES: CPT

## 2022-05-09 PROCEDURE — 97530 THERAPEUTIC ACTIVITIES: CPT

## 2022-05-12 ENCOUNTER — APPOINTMENT (OUTPATIENT)
Dept: PHYSICAL THERAPY | Age: 27
End: 2022-05-12
Payer: COMMERCIAL

## 2022-05-18 ENCOUNTER — HOSPITAL ENCOUNTER (OUTPATIENT)
Dept: PHYSICAL THERAPY | Age: 27
Discharge: HOME | End: 2022-05-18
Payer: COMMERCIAL

## 2022-05-18 PROCEDURE — 97110 THERAPEUTIC EXERCISES: CPT

## 2022-05-18 PROCEDURE — 97530 THERAPEUTIC ACTIVITIES: CPT

## 2022-05-18 PROCEDURE — 97112 NEUROMUSCULAR REEDUCATION: CPT

## 2022-05-18 NOTE — PROGRESS NOTES
PHYSICAL THERAPY - DAILY TREATMENT NOTE      Patient Wilfredo Curran                         Date: 2022  : 1995                                    YES Patient  Verified  Visit #:                     Insurance: Payor: Fuad Christensen / Plan: [place] [employer] PPO / Product Type: PPO /       In time: 10:34 AM Out time: 11:44 AM   Total Treatment Time: 70 min.            Medicare/[employer] Time Tracking (below)   Total Timed Codes (min):  NA 1:1 Treatment Time:  NA      TREATMENT AREA =  Pain in left leg [M79.605]     SUBJECTIVE     Pain Level (on 0 to 10 scale):  4 / 10   Medication Changes/New allergies or changes in medical history, any new surgeries or procedures?    NO    If yes, update Summary List   Subjective Functional Status/Changes:  []????  No changes reported      Pt reports that he has a little more feeling and less numbness in his L foot/ankle. Pt also reports that he is walking better--less limping. Pt relates that he is able to move his toes more (flexion) with attempting to do toe curling with towel exercise, but he still cannot actually scrunch the towel with is L toes. Pt reports decreased hamstring area pain, but his L ankle will have discomfort with walking a lot; up to ~4/10.         OBJECTIVE  Modalities Rationale:     decrease edema, decrease inflammation, decrease pain, increase tissue extensibility and increase muscle contraction/control to improve patient's ability to transfer, stand, walk, community mobility, ADLs, work, household chores, sleep, and recreation/fitness witih less/no pain.                          min []???? Estim, type/location:                                                            []????  att     []????  unatt     []????  w/US     []????  w/ice    []????  w/heat     min []????  Mechanical Traction: type/lbs                    []????  pro   []? ???  sup   []? ???  int   []? ???  cont    []????  before manual    []????  after manual   0  min []????  Ultrasound, settings/location:  1.8 W/cm2 @ 1 MHz 100% to left distal posterior thigh/hamstrings in prone.      min []????  Iontophoresis w/ dexamethasone, location:                                                []????  take home patch       []????  in clinic    10 min [x]????  Ice     []????  Heat    location/position: CP to L posterior thigh post-Tx/prone.      min []????  Vasopneumatic Device, press/temp:       min []????  Other:     [x]? ??? Skin assessment post-treatment (if applicable):    [x]? ???  intact    []????  redness- no adverse reaction     []????redness  adverse reaction:       30 min Therapeutic Exercise:  [x]? ???  See flow sheet   Rationale:      increase ROM and increase strength to improve the patients ability to transfer, stand, walk, community mobility, ADLs, work, household chores, sleep, and recreation/fitness witih less/no pain.     10 min Therapeutic Activity: [x]????  See flow sheet   Rationale:      increase ROM, increase strength, improve coordination, improve balance and increase proprioception to improve the patients ability to transfer, stand, walk, community mobility, ADLs, work, household chores, sleep, and recreation/fitness witih less/no pain.      20 min Neuromuscular Re-ed: [x]????  See flow sheet   Rationale:      increase strength, improve coordination, improve balance and increase proprioception to improve the patients ability to transfer, stand, walk, community mobility, ADLs, work, household chores, sleep, and recreation/fitness witih less/no pain.      0 min Manual Therapy: Technique:      []???? S/DTM []????IASTM []????PROM []???? Passive Stretching   []? ???manual TPR    []????Jt manipulation:Gr I []???? II []????  III []???? IV[]? ??? V[]????  Treatment Area:     Rationale:      decrease pain, increase ROM, increase tissue extensibility, decrease trigger points and increase postural awareness to improve patient's ability to transfer, stand, walk, community mobility, ADLs, work, household chores, sleep, and recreation/fitness witih less/no pain. The manual therapy interventions were performed at a separate and distinct time from the therapeutic activities interventions.     0 min Gait Training:  ___ feet with ___ device on level surfaces with ___ level of assistance   Rationale: To improve ambulation safety and efficiency in order to improve patient's ability to safely ambulate at home for self care.     0 min Self Care:     Rationale:    increase ROM, increase strength, improve coordination, improve balance and increase proprioception to improve the patients ability to transfer, stand, walk, community mobility, ADLs, work, household chores, sleep, and recreation/fitness witih less/no pain.     Billed With/As:   []???? TE   []???? TA   []???? Neuro   []???? Self Care Patient Education: [x]???? Review HEP    []???? Progressed/Changed HEP based on:   []???? positioning   []???? body mechanics   []???? transfers   []???? heat/ice application    []???? other:                 Other Objective/Functional Measures:     Pt did well with T-ball bridging and manual TM walk/push.      Post Treatment Pain Level (on 0 to 10) scale:   0 / 10      ASSESSMENT     Assessment/Changes in Function:       Left ankle weakness for INV notable with DynaDiscs and dual Prostretch activities.      []? ??  See Progress Note/Recertification   Patient will continue to benefit from skilled PT services to modify and progress therapeutic interventions, address functional mobility deficits, address ROM deficits, address strength deficits, analyze and address soft tissue restrictions, analyze and cue movement patterns, analyze and modify body mechanics/ergonomics, assess and modify postural abnormalities and instruct in home and community integration to attain remaining goals.      Progress toward goals / Updated goals:     Good Progress to    [x]? ?? STG    []? ?? LTG  1, 3, and 4 as shown by patient reporting and by observed motions and performance of activities during the PT session.         PLAN               [x]? ???  Upgrade activities as tolerated YES Continue plan of care   []????  Discharge due to :     []????  Other:                  Therapist: Elsie Preston     Date: 05/18/2022 Time: 9:35 AM

## 2022-05-20 ENCOUNTER — HOSPITAL ENCOUNTER (OUTPATIENT)
Dept: PHYSICAL THERAPY | Age: 27
Discharge: HOME | End: 2022-05-20
Payer: COMMERCIAL

## 2022-05-20 PROCEDURE — 97112 NEUROMUSCULAR REEDUCATION: CPT

## 2022-05-20 PROCEDURE — 97530 THERAPEUTIC ACTIVITIES: CPT

## 2022-05-20 PROCEDURE — 97110 THERAPEUTIC EXERCISES: CPT

## 2022-05-20 NOTE — PROGRESS NOTES
PHYSICAL THERAPY - DAILY TREATMENT NOTE      Patient Isaac Curran                         Date: 2022  : 1995                                    YES Patient  Verified  Visit #:                     Insurance: Payor: Lyn Jenkins / Plan: Jah[employer]a Jackson PPO / Product Type: PPO /       In time: 11:02 AM Out time: 12:02 PM   Total Treatment Time: 60 min.            Medicare/[employer] Time Tracking (below)   Total Timed Codes (min):  NA 1:1 Treatment Time:  NA      TREATMENT AREA =  Pain in left leg [M79.605]     SUBJECTIVE     Pain Level (on 0 to 10 scale):  ? / 10--Not Assessed. Medication Changes/New allergies or changes in medical history, any new surgeries or procedures?    NO    If yes, update Summary List   Subjective Functional Status/Changes:  []?????  No changes reported      Pt reports that he has a little more feeling and less numbness in his L foot/ankle--he indicates planter first toe and distal first ray/interspace.   Ankle discomfort, when present (walking a lot) is anterior and posteromedial and sometimes foot arch.      OBJECTIVE  Modalities Rationale:     decrease edema, decrease inflammation, decrease pain, increase tissue extensibility and increase muscle contraction/control to improve patient's ability to transfer, stand, walk, community mobility, ADLs, work, household chores, sleep, and recreation/fitness witih less/no pain.                          min []????? Estim, type/location:                                                            []?????  att     []?????  unatt     []?????  w/US     []?????  w/ice    []?????  w/heat     min []?????  Mechanical Traction: type/lbs                    []?????  pro   []?????  sup   []?????  int   []?????  cont    []?????  before manual    []?????  after manual   0  min []?????  Ultrasound, settings/location:  1.8 W/cm2 @ 1 MHz 100% to left distal posterior thigh/hamstrings in prone.      min []?????  Iontophoresis w/ dexamethasone, location:                                                []?????  take home patch       []?????  in clinic    0 min [x]?????  Ice     []?????  Heat    location/position: CP to L posterior thigh post-Tx/prone.      min []?????  Vasopneumatic Device, press/temp:       min []?????  Other:     [x]????? Skin assessment post-treatment (if applicable):    [x]?????  intact    []?????  redness- no adverse reaction     []?????redness  adverse reaction:       30 min Therapeutic Exercise:  [x]? ????  See flow sheet   Rationale:      increase ROM and increase strength to improve the patients ability to transfer, stand, walk, community mobility, ADLs, work, household chores, sleep, and recreation/fitness witih less/no pain.     10 min Therapeutic Activity: [x]?????  See flow sheet   Rationale:      increase ROM, increase strength, improve coordination, improve balance and increase proprioception to improve the patients ability to transfer, stand, walk, community mobility, ADLs, work, household chores, sleep, and recreation/fitness witih less/no pain.      20 min Neuromuscular Re-ed: [x]?????  See flow sheet   Rationale:      increase strength, improve coordination, improve balance and increase proprioception to improve the patients ability to transfer, stand, walk, community mobility, ADLs, work, household chores, sleep, and recreation/fitness witih less/no pain.      0 min Manual Therapy: Technique:      []????? S/DTM []?????IASTM []?????PROM []????? Passive Stretching   []?????manual TPR    []?????Jt manipulation:Gr I []????? II []?????  III []????? IV[]????? V[]????? Treatment Area:     Rationale:      decrease pain, increase ROM, increase tissue extensibility, decrease trigger points and increase postural awareness to improve patient's ability to transfer, stand, walk, community mobility, ADLs, work, household chores, sleep, and recreation/fitness witih less/no pain.   The manual therapy interventions were performed at a separate and distinct time from the therapeutic activities interventions.     0 min Gait Training:  ___ feet with ___ device on level surfaces with ___ level of assistance   Rationale: To improve ambulation safety and efficiency in order to improve patient's ability to safely ambulate at home for self care.     0 min Self Care:     Rationale:    increase ROM, increase strength, improve coordination, improve balance and increase proprioception to improve the patients ability to transfer, stand, walk, community mobility, ADLs, work, household chores, sleep, and recreation/fitness witih less/no pain.     Billed With/As:   []????? TE   []????? TA   []????? Neuro   []????? Self Care Patient Education: [x]????? Review HEP    []????? Progressed/Changed HEP based on:   []????? positioning   []????? body mechanics   []????? transfers   []????? heat/ice application    []????? other:                 Other Objective/Functional Measures:     Pt did well with T-ball bridging and with toe flexion activities.      Post Treatment Pain Level (on 0 to 10) scale:   0 / 10      ASSESSMENT     Assessment/Changes in Function:       Left ankle weakness for INV with T-band resistance--first few reps are good, then form diminishes. Left ankle DF is strong.      []????  See Progress Note/Recertification   Patient will continue to benefit from skilled PT services to modify and progress therapeutic interventions, address functional mobility deficits, address ROM deficits, address strength deficits, analyze and address soft tissue restrictions, analyze and cue movement patterns, analyze and modify body mechanics/ergonomics, assess and modify postural abnormalities and instruct in home and community integration to attain remaining goals.      Progress toward goals / Updated goals:     Good Progress to    [x]? ??? STG    []???? LTG  1, 3, and 4 as shown by patient reporting and by observed motions and performance of activities during the PT session.         PLAN               [x]?????  Upgrade activities as tolerated YES Continue plan of care   []?????  Discharge due to :     []?????  Other:                  Therapist: Adia Villareal     Date: 05/20/2022 Time: 7:36 AM

## 2022-05-25 ENCOUNTER — HOSPITAL ENCOUNTER (OUTPATIENT)
Dept: PHYSICAL THERAPY | Age: 27
Discharge: HOME | End: 2022-05-25
Payer: COMMERCIAL

## 2022-05-25 PROCEDURE — 97112 NEUROMUSCULAR REEDUCATION: CPT

## 2022-05-25 PROCEDURE — 97110 THERAPEUTIC EXERCISES: CPT

## 2022-05-25 PROCEDURE — 97530 THERAPEUTIC ACTIVITIES: CPT

## 2022-05-25 NOTE — PROGRESS NOTES
PHYSICAL THERAPY - DAILY TREATMENT NOTE     Patient Name: Tello         Date: 2022  : 1995   YES Patient  Verified  Visit #:     Insurance: Payor: Junior Draper / Plan: [place] [employer] PPO / Product Type: PPO /      In time: 10:00 am Out time: 10:56   Total Treatment Time: 56     Medicare/[employer] Time Tracking (below)   Total Timed Codes (min):  - 1:1 Treatment Time:  -     TREATMENT AREA =  Pain in left leg [M79.605]    SUBJECTIVE    Pain Level (on 0 to 10 scale):  2-3/ 10- heel /achilles region   Medication Changes/New allergies or changes in medical history, any new surgeries or procedures?     NO    If yes, update Summary List   Subjective Functional Status/Changes:  []  No changes reported       Functional improvements:sleeping through the night  Functional impairments: increased soreness with  prolonged          OBJECTIVE  Modalities Rationale:     decrease edema, decrease inflammation, decrease pain and increase tissue extensibility to improve patient's ability to perform functional ADLs without pain   min [] Estim, type/location:                                      []  att     []  unatt     []  w/US     []  w/ice    []  w/heat    min []  Mechanical Traction: type/lbs                   []  pro   []  sup   []  int   []  cont    []  before manual    []  after manual    min []  Ultrasound, settings/location:      min []  Iontophoresis w/ dexamethasone, location:                                               []  take home patch       []  in clinic   10 min [x]  Ice     []  Heat    location/position: Supine LE elevated left ankle    min []  Vasopneumatic Device, press/temp:    If using vaso (only need to measure limb vaso being performed on)      pre-treatment girth :       post-treatment girth :       measured at (landmark location) :      min []  Other:    [x] Skin assessment post-treatment (if applicable):    [x]  intact    []  redness- no adverse reaction                  []redness  adverse reaction:      25 min Therapeutic Exercise:  [x]  See flow sheet   Rationale:      increase ROM and increase strength to improve the patients ability to perform functional ADLs without pain    9 min Therapeutic Activity: [x]  See flow sheet   Rationale:      increase ROM, increase strength, improve coordination, improve balance and increase proprioception to improve the patients ability to perform functional ADLs without pain    12 min Neuromuscular Re-ed: [x]  See flow sheet   Rationale:      increase ROM, increase strength, improve coordination, improve balance and increase proprioception to improve the patients ability toperform functional ADLs without pain        Billed With/As:   [] TE   [] TA   [] Neuro   [] Self Care Patient Education: [x] Review HEP    [] Progressed/Changed HEP based on:   [] positioning   [] body mechanics   [] transfers   [] heat/ice application    [] other:        Other Objective/Functional Measures:    Pt education in use of good supportive foot wear, use of hip hinge in partial squat for lifting  progressions     Post Treatment Pain Level (on 0 to 10) scale:   0 / 10     ASSESSMENT    Assessment/Changes in Function:     Pt demonstrating moderate challenge in SLS on left and star taps with weight shift  Focus on hip and ankle control in partial squats using mirror for visual feed back  Pt reporting mild pain with dynamic knee flex -abolishing with VCs to avoid use of  plyo movement during exercise   []  See Progress Note/Recertification   Patient will continue to benefit from skilled PT services to modify and progress therapeutic interventions, address functional mobility deficits, address ROM deficits, address strength deficits, analyze and address soft tissue restrictions, analyze and cue movement patterns, analyze and modify body mechanics/ergonomics, address imbalance/dizziness and instruct in home and community integration to attain remaining goals. Progress toward goals / Updated goals:    Good Progress to    [x] STG    [] LTG  1 as shown by improving stability in weight bearing, decreasing pain in ADLs        PLAN    [x]  Upgrade activities as tolerated YES Continue plan of care   []  Discharge due to :    []  Other:      Therapist: Zen Bradley PTA    Date: 5/25/2022 Time: 10:56 AM     Future Appointments   Date Time Provider John Cope   5/27/2022 12:45 PM Kobi Cash PT [facility]

## 2022-05-27 ENCOUNTER — HOSPITAL ENCOUNTER (OUTPATIENT)
Dept: PHYSICAL THERAPY | Age: 27
Discharge: HOME | End: 2022-05-27
Payer: COMMERCIAL

## 2022-05-27 PROCEDURE — 97112 NEUROMUSCULAR REEDUCATION: CPT

## 2022-05-27 PROCEDURE — 97530 THERAPEUTIC ACTIVITIES: CPT

## 2022-05-27 PROCEDURE — 97110 THERAPEUTIC EXERCISES: CPT
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
## 2022-05-27 NOTE — PROGRESS NOTES
PHYSICAL THERAPY - DAILY TREATMENT NOTE      Patient Manuela Curran                         Date: 2022  : 1995                                    YES Patient  Verified  Visit #:                     Insurance: Payor: Joshua Montague / Plan: Jen Rosales PPO / Product Type: PPO /       In time: 12:52 PM Out time: 1:56 PM   Total Treatment Time: 64 min.            Medicare/[employer] Time Tracking (below)   Total Timed Codes (min):  NA 1:1 Treatment Time:  NA      TREATMENT AREA =  Pain in left leg [M79.605]     SUBJECTIVE     Pain Level (on 0 to 10 scale):  3-4 / 10   Medication Changes/New allergies or changes in medical history, any new surgeries or procedures?    NO    If yes, update Summary List   Subjective Functional Status/Changes:  []??????  No changes reported      Pt reports discomfort L distal Achilles area, but no longer having burning sensations in L foot/toes and numb area has decreased in size and intensity. Pt reports no L thigh/HS area pains.   Pt reports that his toe flexion AROM and strength have improved and he can now move the towel when doing toe curl exercises.        OBJECTIVE  Modalities Rationale:     decrease edema, decrease inflammation, decrease pain, increase tissue extensibility and increase muscle contraction/control to improve patient's ability to transfer, stand, walk, community mobility, ADLs, work, household chores, sleep, and recreation/fitness witih less/no pain.                          min []?????? Estim, type/location:                                                            []??????  att     []??????  unatt     []??????  w/US     []??????  w/ice    []??????  w/heat     min []??????  Mechanical Traction: type/lbs                    []??????  pro   []??????  sup   []??????  int   []??????  cont    []??????  before manual    []??????  after manual   0  min []??????  Ultrasound, settings/location:  1.8 W/cm2 @ 1 MHz 100% to left distal posterior thigh/hamstrings in prone.      min []??????  Iontophoresis w/ dexamethasone, location:                                                []??????  take home patch       []??????  in clinic    10 min [x]??????  Ice     []??????  Heat    location/position: CP to L ankle area post-Tx/supine.      min []??????  Vasopneumatic Device, press/temp:       min []??????  Other:     [x]?????? Skin assessment post-treatment (if applicable):    [x]??????  intact    []??????  redness- no adverse reaction     []??????redness  adverse reaction:       30 min Therapeutic Exercise:  [x]??????  See flow sheet--includes Reassessment. Rationale:      increase ROM and increase strength to improve the patients ability to transfer, stand, walk, community mobility, ADLs, work, household chores, sleep, and recreation/fitness witih less/no pain.     10 min Therapeutic Activity: [x]??????  See flow sheet   Rationale:      increase ROM, increase strength, improve coordination, improve balance and increase proprioception to improve the patients ability to transfer, stand, walk, community mobility, ADLs, work, household chores, sleep, and recreation/fitness witih less/no pain.      14 min Neuromuscular Re-ed: [x]??????  See flow sheet   Rationale:      increase strength, improve coordination, improve balance and increase proprioception to improve the patients ability to transfer, stand, walk, community mobility, ADLs, work, household chores, sleep, and recreation/fitness witih less/no pain.      0 min Manual Therapy: Technique:      []?????? S/DTM []??????IASTM []??????PROM []?????? Passive Stretching   []??????manual TPR    []??????Jt manipulation:Gr I []?????? II []??????  III []?????? IV[]?????? V[]??????   Treatment Area:     Rationale:      decrease pain, increase ROM, increase tissue extensibility, decrease trigger points and increase postural awareness to improve patient's ability to transfer, stand, walk, community mobility, ADLs, work, household chores, sleep, and recreation/fitness witih less/no pain. The manual therapy interventions were performed at a separate and distinct time from the therapeutic activities interventions.     0 min Gait Training:  ___ feet with ___ device on level surfaces with ___ level of assistance   Rationale:  To improve ambulation safety and efficiency in order to improve patient's ability to safely ambulate at home for self care.     0 min Self Care:     Rationale:    increase ROM, increase strength, improve coordination, improve balance and increase proprioception to improve the patients ability to transfer, stand, walk, community mobility, ADLs, work, household chores, sleep, and recreation/fitness witih less/no pain.     Billed With/As:   []?????? TE   []?????? TA   []?????? Neuro   []?????? Self Care Patient Education: [x]?????? Review HEP    []?????? Progressed/Changed HEP based on:   []?????? positioning   []?????? body mechanics   []?????? transfers   []?????? heat/ice application    []?????? other:                 Other Objective/Functional Measures:     Improved HS flexibility and strength.      Post Treatment Pain Level (on 0 to 10) scale:   ? / 10--Not Reassessed.      ASSESSMENT     Assessment/Changes in Function:       Left ankle weakness for PF and INV still present with some discomfort stressing the foot; toe flexion still weak but much improved.      [x]?????  See Progress Note/Recertification   Patient will continue to benefit from skilled PT services to modify and progress therapeutic interventions, address functional mobility deficits, address ROM deficits, address strength deficits, analyze and address soft tissue restrictions, analyze and cue movement patterns, analyze and modify body mechanics/ergonomics, assess and modify postural abnormalities and instruct in home and community integration to attain remaining goals.      Progress toward goals / Updated goals:     Good Progress to    [x]????? STG    []????? LTG  1, 3, and 4 as shown by patient reporting and by observed motions and performance of activities during the PT session.         PLAN               [x]??????  Upgrade activities as tolerated YES Continue plan of care   []??????  Discharge due to :     []??????  Other:                  Therapist: Bridget Mcdonough     Date: 05/27/2022 Time: 7:29 AM

## 2022-06-17 ENCOUNTER — HOSPITAL ENCOUNTER (OUTPATIENT)
Dept: PHYSICAL THERAPY | Age: 27
Discharge: HOME | End: 2022-06-17
Payer: COMMERCIAL

## 2022-06-17 PROCEDURE — 97110 THERAPEUTIC EXERCISES: CPT

## 2022-06-17 PROCEDURE — 97112 NEUROMUSCULAR REEDUCATION: CPT

## 2022-06-17 PROCEDURE — 97530 THERAPEUTIC ACTIVITIES: CPT

## 2022-06-17 NOTE — PROGRESS NOTES
PHYSICAL THERAPY - DAILY TREATMENT NOTE      Patient Elaina Curran                         Date: 2022  : 1995                                    YES Patient  Verified  Visit #:                     Insurance: Payor: Veronica Cardenas / Plan: Anaid Valente PPO / Product Type: PPO /       In time: 10:10 AM Out time: 11:10 AM   Total Treatment Time: 60 min.            Medicare/[facility] Time Tracking (below)   Total Timed Codes (min):  NA 1:1 Treatment Time:  NA      TREATMENT AREA =  Pain in left leg [M79.605]     SUBJECTIVE     Pain Level (on 0 to 10 scale):  0 / 10   Medication Changes/New allergies or changes in medical history, any new surgeries or procedures?    NO    If yes, update Summary List   Subjective Functional Status/Changes:  []???????  No changes reported      Pt reports \"stinging\" discomfort in L foot has been constant, but he is now on Neurontin and it helps; he occasionally has L posterior calf (mostly distal) discomfort. Pt is to see a neurologist on Monday (22).          OBJECTIVE  Modalities Rationale:     decrease edema, decrease inflammation, decrease pain, increase tissue extensibility and increase muscle contraction/control to improve patient's ability to transfer, stand, walk, community mobility, ADLs, work, household chores, sleep, and recreation/fitness witih less/no pain.                          min []??????? Estim, type/location:                                                            []???????  att     []???????  unatt     []???????  w/US     []???????  w/ice    []???????  w/heat     min []???????  Mechanical Traction: type/lbs                    []???????  pro   []???????  sup   []???????  int   []???????  cont    []???????  before manual    []???????  after manual   0  min []???????  Ultrasound, settings/location:  1.8 W/cm2 @ 1 MHz 100% to left distal posterior thigh/hamstrings in prone.      min []???????  Iontophoresis w/ dexamethasone, location:                                              []???????  take home patch       []???????  in clinic    10 min [x]???????  Ice     []???????  Heat    location/position: CP to L ankle area post-Tx/supine.      min []???????  Vasopneumatic Device, press/temp:       min []???????  Other:     [x]??????? Skin assessment post-treatment (if applicable):    [x]???????  intact    []???????  redness- no adverse reaction     []???????redness - adverse reaction:       25 min Therapeutic Exercise:  [x]???????  See flow sheet   Rationale:      increase ROM and increase strength to improve the patients ability to transfer, stand, walk, community mobility, ADLs, work, household chores, sleep, and recreation/fitness witih less/no pain.     10 min Therapeutic Activity: [x]???????  See flow sheet   Rationale:      increase ROM, increase strength, improve coordination, improve balance and increase proprioception to improve the patients ability to transfer, stand, walk, community mobility, ADLs, work, household chores, sleep, and recreation/fitness witih less/no pain.      15 min Neuromuscular Re-ed: [x]???????  See flow sheet   Rationale:      increase strength, improve coordination, improve balance and increase proprioception to improve the patients ability to transfer, stand, walk, community mobility, ADLs, work, household chores, sleep, and recreation/fitness witih less/no pain.      0 min Manual Therapy: Technique:      []??????? S/DTM []???????IASTM []???????PROM []??????? Passive Stretching   []???????manual TPR    []???????Jt manipulation:Gr I []??????? II []???????  III []??????? IV[]??????? V[]??????? Treatment Area:     Rationale:      decrease pain, increase ROM, increase tissue extensibility, decrease trigger points and increase postural awareness to improve patient's ability to transfer, stand, walk, community mobility, ADLs, work, household chores, sleep, and recreation/fitness witih less/no pain.   The manual therapy interventions were performed at a separate and distinct time from the therapeutic activities interventions.     0 min Gait Training:  ___ feet with ___ device on level surfaces with ___ level of assistance   Rationale: To improve ambulation safety and efficiency in order to improve patient's ability to safely ambulate at home for self care.     0 min Self Care:     Rationale:    increase ROM, increase strength, improve coordination, improve balance and increase proprioception to improve the patients ability to transfer, stand, walk, community mobility, ADLs, work, household chores, sleep, and recreation/fitness witih less/no pain.     Billed With/As:   []??????? TE   []??????? TA   []??????? Neuro   []??????? Self Care Patient Education: [x]??????? Review HEP    []??????? Progressed/Changed HEP based on:   []??????? positioning   []??????? body mechanics   []??????? transfers   []??????? heat/ice application    []??????? other:                 Other Objective/Functional Measures:     Inverted BOSU activities were challenging. .      Post Treatment Pain Level (on 0 to 10) scale:   0 / 10      ASSESSMENT     Assessment/Changes in Function:       Left ankle weakness for PF and INV still present and noted with SLS on dual Prostretch strengthening exercises.      []??????  See Progress Note/Recertification   Patient will continue to benefit from skilled PT services to modify and progress therapeutic interventions, address functional mobility deficits, address ROM deficits, address strength deficits, analyze and address soft tissue restrictions, analyze and cue movement patterns, analyze and modify body mechanics/ergonomics, assess and modify postural abnormalities and instruct in home and community integration to attain remaining goals.      Progress toward goals / Updated goals:     Good Progress to    [x]?????? STG    []?????? LTG  1, 3, and 4 as shown by patient reporting and by observed motions and performance of activities during the PT session.         PLAN               [x]???????  Upgrade activities as tolerated YES Continue plan of care   []???????  Discharge due to :     []???????  Other:                  Therapist: KEITH Cornejo     Date: 06/17/2022 Time: 7:11 AM

## 2022-07-06 ENCOUNTER — HOSPITAL ENCOUNTER (OUTPATIENT)
Dept: PHYSICAL THERAPY | Age: 27
Discharge: HOME | End: 2022-07-06
Payer: COMMERCIAL

## 2022-07-06 PROCEDURE — 97110 THERAPEUTIC EXERCISES: CPT

## 2022-07-06 PROCEDURE — 97530 THERAPEUTIC ACTIVITIES: CPT

## 2022-07-06 PROCEDURE — 97112 NEUROMUSCULAR REEDUCATION: CPT
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
## 2022-07-06 NOTE — PROGRESS NOTES
PHYSICAL THERAPY - DAILY TREATMENT NOTE      Patient Name: Stephane Curran                         Date: 2022  : 1995                                    YES Patient  Verified  Visit #:   10   of   12                  Insurance: Payor: [employer] / Plan: [address] [place] PPO / Product Type: PPO /       In time: 3:03 PM Out time: 4:05 PM   Total Treatment Time: 62 min.            Medicare/"[employer] Time Tracking (below)   Total Timed Codes (min):  NA 1:1 Treatment Time:  NA      TREATMENT AREA =  Pain in left leg [M79.605]     SUBJECTIVE     Pain Level (on 0 to 10 scale):  0 / 10   Medication Changes/New allergies or changes in medical history, any new surgeries or procedures?    NO    If yes, update Summary List   Subjective Functional Status/Changes:  []????????  No changes reported      Pt reports decreasing area of numbness L foot/toe; can still get discomfort L Achilles area and/or experience limping when first up and walking in the moring, but resolves with continued walking.   Pt to return to work tomorrow; he will wear arch support orthotic in work boots as directed by orthopedist.  Pt relates EMG/NCV showed mild nerve deficit in L lower leg.         OBJECTIVE  Modalities Rationale:     decrease edema, decrease inflammation, decrease pain, increase tissue extensibility and increase muscle contraction/control to improve patient's ability to transfer, stand, walk, community mobility, ADLs, work, household chores, sleep, and recreation/fitness witih less/no pain.                         8 min [x]???????? Estim, type/location:          [place] NMES at 10/10 with 2 second ramp x 8 min total--left plantar/medial foot for first toe flexion, also more central and lateral for other toes flexion.                                        [x]????????  att     []????????  unatt     []????????  w/US     []????????  w/ice    []????????  w/heat     min []????????  Mechanical Traction: type/lbs                  []????????  pro   []????????  sup   []????????  int   []????????  cont    []????????  before manual    []????????  after manual   0  min []????????  Ultrasound, settings/location:  1.8 W/cm2 @ 1 MHz 100% to left distal posterior thigh/hamstrings in prone.      min []????????  Iontophoresis w/ dexamethasone, location:                                                []????????  take home patch       []????????  in clinic    0 min []????????  Ice     []????????  Heat    location/position: CP to L ankle area post-Tx/supine.      min []????????  Vasopneumatic Device, press/temp:       min []????????  Other:     [x]???????? Skin assessment post-treatment (if applicable):    [x]????????  intact    []????????  redness- no adverse reaction     []????????redness - adverse reaction:       25 min Therapeutic Exercise:  [x]????????  See flow sheet--included Reassessment.    Rationale:      increase ROM and increase strength to improve the patients ability to transfer, stand, walk, community mobility, ADLs, work, household chores, sleep, and recreation/fitness witih less/no pain.     10 min Therapeutic Activity: [x]????????  See flow sheet   Rationale:      increase ROM, increase strength, improve coordination, improve balance and increase proprioception to improve the patients ability to transfer, stand, walk, community mobility, ADLs, work, household chores, sleep, and recreation/fitness witih less/no pain.      19 min Neuromuscular Re-ed: [x]????????  See flow sheet   Rationale:      increase strength, improve coordination, improve balance and increase proprioception to improve the patients ability to transfer, stand, walk, community mobility, ADLs, work, household chores, sleep, and recreation/fitness witih less/no pain.      0 min Manual Therapy: Technique:      []???????? S/DTM []????????IASTM []????????PROM []???????? Passive Stretching   []????????manual TPR    []????????Jt manipulation:Gr I []???????? II []????????  III []???????? IV[]???????? V[]???????? Treatment Area:     Rationale:      decrease pain, increase ROM, increase tissue extensibility, decrease trigger points and increase postural awareness to improve patient's ability to transfer, stand, walk, community mobility, ADLs, work, household chores, sleep, and recreation/fitness witih less/no pain. The manual therapy interventions were performed at a separate and distinct time from the therapeutic activities interventions.     0 min Gait Training:  ___ feet with ___ device on level surfaces with ___ level of assistance   Rationale: To improve ambulation safety and efficiency in order to improve patient's ability to safely ambulate at home for self care.     0 min Self Care:  Discussed well fitted and tightly tied/secured work boots to allow foot orthotic to support correctly. Rationale:    increase ROM, increase strength, improve coordination, improve balance and increase proprioception to improve the patients ability to transfer, stand, walk, community mobility, ADLs, work, household chores, sleep, and recreation/fitness witih less/no pain.     Billed With/As:   [x]???????? TE   [x]???????? TA   [x]???????? Neuro   [x]???????? Self Care Patient Education: [x]???????? Review HEP    []???????? Progressed/Changed HEP based on:   []???????? positioning   []???????? body mechanics   []???????? transfers   []???????? heat/ice application    [x]???????? other:  See Self-Care above and PT/patient discussed and performed standing heel raises through toes (on rolled towel edge) for flexor strengthening, as well as band use and/or self-manual resistance against finger pressure; also SLS ecc/conc arch lowering/raising exercises and SLS balance with ball throw/catch for HEP.             Other Objective/Functional Measures:     Weakness L ankle INV >> PF and toe flexion.   Some lateral ankle discomfort at end of session.      Post Treatment Pain Level (on 0 to 10) scale:   1-2 / 10      ASSESSMENT     Assessment/Changes in Function:       Good ROM/flexibility and MMT for other ankle motions and for toe extension.      [x]???????  See Progress Note/Recertification   Patient will continue to benefit from skilled PT services to modify and progress therapeutic interventions, address functional mobility deficits, address ROM deficits, address strength deficits, analyze and address soft tissue restrictions, analyze and cue movement patterns, analyze and modify body mechanics/ergonomics, assess and modify postural abnormalities and instruct in home and community integration to attain remaining goals.      Progress toward goals / Updated goals:     Good Progress to    [x]??????? STG    []??????? LTG  1, 3, and 4 as shown by patient reporting and by observed motions and performance of activities during the PT session.         PLAN               [x]????????  Upgrade activities as tolerated YES Continue plan of care   []????????  Discharge due to :     []????????  Other:                  Therapist: KEITH Celaya     Date: 07/06/2022 Time: 8:08 AM
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
## 2022-08-09 ENCOUNTER — HOSPITAL ENCOUNTER (OUTPATIENT)
Dept: PHYSICAL THERAPY | Age: 27
Discharge: HOME | End: 2022-08-09
Payer: COMMERCIAL

## 2022-08-09 PROCEDURE — 97162 PT EVAL MOD COMPLEX 30 MIN: CPT
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
## 2022-08-09 NOTE — PROGRESS NOTES
PHYSICAL THERAPY - DAILY TREATMENT NOTE     Patient Name:  Mariano Carty        Date:  2022  :  1995   YES Patient  Verified  Visit #:     Insurance:  Payor: Paul Dooley / Plan: [place] [employer] [place] / Product Type: PPO /      In time: 3:52 PM Out time: 4:40 PM   Total Treatment Time: 48 min. Medicare/[employer] Time Tracking (below)   Total Timed Codes (min):  NA 1:1 Treatment Time:  NA     TREATMENT AREA =  Upper back pain [M54.9]    SUBJECTIVE    Pain Level (on 0 to 10 scale):  7 / 10   Medication Changes/New allergies or changes in medical history, any new surgeries or procedures? NO    If yes, update Summary List   Subjective Functional Status/Changes:  []  No changes reported     See POC         OBJECTIVE  Modalities Rationale:     decrease inflammation, decrease pain, and increase tissue extensibility to improve patient's ability to perform ADLs, work tasks, household chores, community mobility, sleep, and recreation/fitness activities with less/no pain. min [] Estim, type/location:                                      []  att     []  unatt     []  w/US     []  w/ice    []  w/heat    min []  Mechanical Traction: type/lbs                   []  pro   []  sup   []  int   []  cont    []  before manual    []  after manual    min []  Ultrasound, settings/location:      min []  Iontophoresis w/ dexamethasone, location:                                               []  take home patch       []  in clinic   8 min []  Ice     [x]  Heat    location/position: Upper back and neck in supine.     min []  Vasopneumatic Device, press/temp:     min []  Other:    [x] Skin assessment post-treatment (if applicable):    [x]  intact    []  redness- no adverse reaction     []redness - adverse reaction:      8 min Therapeutic Exercise:  [x]  See flow sheet   Rationale:      increase ROM, increase strength, and increase proprioception to improve the patients ability to perform ADLs, work tasks, household chores, community mobility, sleep, and recreation/fitness activities with less/no pain. 0 min Therapeutic Activity: [x]  See flow sheet   Rationale:      increase ROM, increase strength, improve coordination, and increase proprioception to improve the patients ability to    perform ADLs, work tasks, household chores, community mobility, sleep, and recreation/fitness activities with less/no pain. 0 min Neuromuscular Re-ed: [x]  See flow sheet   Rationale:      increase strength, improve coordination, and increase proprioception to improve the patients ability to perform ADLs, work tasks, household chores, community mobility, sleep, and recreation/fitness activities with less/no pain. 0 min Manual Therapy: Technique:      [] S/DTM []IASTM []PROM [] Passive Stretching   []manual TPR    []Jt manipulation:Gr I [] II []  III [] IV[] V[]  Treatment Area:     Rationale:      decrease pain, increase ROM, increase tissue extensibility, decrease trigger points, and increase postural awareness to improve patient's ability to perform ADLs, work tasks, household chores, community mobility, sleep, and recreation/fitness activities with less/no pain. The manual therapy interventions were performed at a separate and distinct time from the therapeutic activities interventions. min Self Care:    Rationale:    increase ROM, increase strength, improve coordination, and increase proprioception to improve the patients ability to perform ADLs, work tasks, household chores, community mobility, sleep, and recreation/fitness activities with less/no pain. Billed With/As:   [] TE   [] TA   [] Neuro   [] Self Care Patient Education: [x] Review HEP    [] Progressed/Changed HEP based on:   [] positioning   [] body mechanics   [] transfers   [] heat/ice application    [] other:        Other Objective/Functional Measures:    See POC     Post Treatment Pain Level (on 0 to 10 scale):   ? / 10--Not Reassessed. ASSESSMENT    Assessment/Changes in Function:     See POC     []  See Progress Note/Recertification   Patient will continue to benefit from skilled PT services to modify and progress therapeutic interventions, address functional mobility deficits, address ROM deficits, address strength deficits, analyze and address soft tissue restrictions, analyze and cue movement patterns, analyze and modify body mechanics/ergonomics, assess and modify postural abnormalities, and instruct in home and community integration to attain remaining goals.       Progress toward goals / Updated goals:    See POC       PLAN    [x]  Upgrade activities as tolerated YES Continue plan of care   []  Discharge due to :      []  Other:        Therapist: Karri Faust PT    Date: 8/9/2022 Time: 5:27 PM     Future Appointments   Date Time Provider John Cope   8/15/2022  8:15 AM Charisma Lopez PT [facility]   8/18/2022  4:00 PM Charisma Lopez PT [facility]

## 2022-08-15 ENCOUNTER — HOSPITAL ENCOUNTER (OUTPATIENT)
Dept: PHYSICAL THERAPY | Age: 27
Discharge: HOME | End: 2022-08-15
Payer: COMMERCIAL

## 2022-08-15 PROCEDURE — 97110 THERAPEUTIC EXERCISES: CPT

## 2022-08-15 PROCEDURE — 97140 MANUAL THERAPY 1/> REGIONS: CPT

## 2022-08-15 NOTE — PROGRESS NOTES
PHYSICAL THERAPY - DAILY TREATMENT NOTE      Patient Name:  Mariano Carty                                    Date:  08/15/2022  :  1995                                   YES Patient  Verified  Visit #:   2   of   12                  Insurance:  Payor: Paul Dooley / Plan: [place] [employer] [place] / Product Type: PPO /       In time: 8:26 AM Out time: 9:22 AM   Total Treatment Time: 56 min. Medicare/[employer] Time Tracking (below)   Total Timed Codes (min):  NA 1:1 Treatment Time:  NA      TREATMENT AREA =  Upper back pain [M54.9]     SUBJECTIVE     Pain Level (on 0 to 10 scale):  7  10   Medication Changes/New allergies or changes in medical history, any new surgeries or procedures? NO    If yes, update Summary List   Subjective Functional Status/Changes:  []  No changes reported      Pt reports R > L neck and upper scapular pain, currently ~7/10. No problems with or questions about HEP; did not have a ball to try self-DTM/TPR with ball in sock/against wall at home yet. OBJECTIVE  Modalities Rationale:     decrease inflammation, decrease pain, and increase tissue extensibility to improve patient's ability to perform ADLs, work tasks, household chores, community mobility, sleep, and recreation/fitness activities with less/no pain.                 min [] Estim, type/location:                                                            []  att     []  unatt     []  w/US     []  w/ice    []  w/heat     min []  Mechanical Traction: type/lbs                    []  pro   []  sup   []  int   []  cont    []  before manual    []  after manual     min []  Ultrasound, settings/location:        min []  Iontophoresis w/ dexamethasone, location:                                                []  take home patch       []  in clinic   10 min []  Ice     [x]  Heat    location/position: Upper back and neck pre-Tx/supine with bolster under LEs.     min []  Vasopneumatic Device, press/temp:       min []  Other:     [x] Skin assessment post-treatment (if applicable):    [x]  intact    []  redness- no adverse reaction     []redness - adverse reaction:       15 min Therapeutic Exercise:  [x]  See flow sheet   Rationale:      increase ROM, increase strength, and increase proprioception to improve the patients ability to perform ADLs, work tasks, household chores, community mobility, sleep, and recreation/fitness activities with less/no pain. 0 min Therapeutic Activity: [x]  See flow sheet   Rationale:      increase ROM, increase strength, improve coordination, and increase proprioception to improve the patients ability to    perform ADLs, work tasks, household chores, community mobility, sleep, and recreation/fitness activities with less/no pain. 0 min Neuromuscular Re-ed: [x]  See flow sheet   Rationale:      increase strength, improve coordination, and increase proprioception to improve the patients ability to perform ADLs, work tasks, household chores, community mobility, sleep, and recreation/fitness activities with less/no pain. 31 min Manual Therapy: Technique:      [] S/DTM []IASTM [x]PROM [x] Passive Stretching  []manual TPR    []Jt manipulation:Gr I [] II []  III [] IV[] V[]  Treatment Area:  Cervical spine area in supine--bouts of suboccipital releases, cervical releases, and manual cervical traction; L and R UT/scalene stretches; left and right UE long axis distractions/arm pulls   Rationale:      decrease pain, increase ROM, increase tissue extensibility, decrease trigger points, and increase postural awareness to improve patient's ability to perform ADLs, work tasks, household chores, community mobility, sleep, and recreation/fitness activities with less/no pain. The manual therapy interventions were performed at a separate and distinct time from the therapeutic activities interventions.        min Self Care:     Rationale:    increase ROM, increase strength, improve coordination, and increase proprioception to improve the patients ability to perform ADLs, work tasks, household chores, community mobility, sleep, and recreation/fitness activities with less/no pain. Billed With/As:   [x] TE   [] TA   [] Neuro   [x] Self Care Patient Education: [x] Review HEP    [x] Progressed/Changed HEP based on:  [x] positioning   [x] body mechanics   [] transfers   [] heat/ice application    [x] other:  Patient instructed in and performed new HEP activities and given handouts with pictures/written directions for same; copies of handouts placed in patient's chart. Other Objective/Functional Measures:     Painful with arm pulls > 90 degrees shldr aBd/scaption and flexion in supine, but good ROM during child's pose stretches. Post Treatment Pain Level (on 0 to 10 scale):   7 / 10      ASSESSMENT    Assessment/Changes in Function:     Discomfort with manual therapy above minimal levels of traction and stretching. []  See Progress Note/Recertification   Patient will continue to benefit from skilled PT services to modify and progress therapeutic interventions, address functional mobility deficits, address ROM deficits, address strength deficits, analyze and address soft tissue restrictions, analyze and cue movement patterns, analyze and modify body mechanics/ergonomics, assess and modify postural abnormalities, and instruct in home and community integration to attain remaining goals. Progress toward goals / Updated goals:    Slow Progress to    [x] STG    [] LTG 1 - 5 as shown by patient reporting and by observed performance of activities during the PT session. PLAN           [x]  Upgrade activities as tolerated YES Continue plan of care   []  Discharge due to :       [x]  Other:    Try US.             Therapist: MIKALA Solis     Date:  08/15/2022 Time:  7:14 AM     Future Appointments   Date Time Provider John Cope   8/15/2022  8:15 AM Johnnie Art PT [facility]   8/18/2022 4:00 PM Claudetta Snooks, PT [facility]

## 2022-08-18 ENCOUNTER — HOSPITAL ENCOUNTER (OUTPATIENT)
Dept: PHYSICAL THERAPY | Age: 27
Discharge: HOME | End: 2022-08-18
Payer: COMMERCIAL

## 2022-08-18 PROCEDURE — 97140 MANUAL THERAPY 1/> REGIONS: CPT

## 2022-08-18 PROCEDURE — 97110 THERAPEUTIC EXERCISES: CPT

## 2022-08-18 NOTE — PROGRESS NOTES
PHYSICAL THERAPY - DAILY TREATMENT NOTE      Patient Name:  Caitlyn Concepcion                                    Date:  2022  :  1995                                   YES Patient  Verified  Visit #:   3   of   12                  Insurance:  Payor: Palma Bridges / Plan: [place] [employer] PPO / Product Type: PPO /       In time: 3:55 PM Out time: 4:50 PM   Total Treatment Time: 55 min. Medicare/[employer] Time Tracking (below)   Total Timed Codes (min):  NA 1:1 Treatment Time:  NA      TREATMENT AREA =  Upper back pain [M54.9]     SUBJECTIVE     Pain Level (on 0 to 10 scale):  6 / 10   Medication Changes/New allergies or changes in medical history, any new surgeries or procedures? NO    If yes, update Summary List   Subjective Functional Status/Changes:  []  No changes reported      Pt reports symptoms a little irritated later after last PT session. OBJECTIVE  Modalities Rationale:     decrease inflammation, decrease pain, and increase tissue extensibility to improve patient's ability to perform ADLs, work tasks, household chores, community mobility, sleep, and recreation/fitness activities with less/no pain.                           min [] Estim, type/location:                                                            []  att     []  unatt     []  w/US     []  w/ice    []  w/heat     min []  Mechanical Traction: type/lbs                    []  pro   []  sup   []  int   []  cont    []  before manual    []  after manual     min []  Ultrasound, settings/location:        min []  Iontophoresis w/ dexamethasone, location:                                                []  take home patch       []  in clinic   10 min []  Ice     [x]  Heat    location/position: Upper back and neck pre-Tx/supine with bolster under LEs.     min []  Vasopneumatic Device, press/temp:       min []  Other:     [x] Skin assessment post-treatment (if applicable):    [x]  intact    []  redness- no adverse reaction     []redness - adverse reaction:       20 min Therapeutic Exercise:  [x]  See flow sheet   Rationale:      increase ROM, increase strength, and increase proprioception to improve the patients ability to perform ADLs, work tasks, household chores, community mobility, sleep, and recreation/fitness activities with less/no pain. 0 min Therapeutic Activity: [x]  See flow sheet   Rationale:      increase ROM, increase strength, improve coordination, and increase proprioception to improve the patients ability to    perform ADLs, work tasks, household chores, community mobility, sleep, and recreation/fitness activities with less/no pain. 0 min Neuromuscular Re-ed: [x]  See flow sheet   Rationale:      increase strength, improve coordination, and increase proprioception to improve the patients ability to perform ADLs, work tasks, household chores, community mobility, sleep, and recreation/fitness activities with less/no pain. 25 min Manual Therapy: Technique:      [] S/DTM []IASTM [x]PROM [x] Passive Stretching  []manual TPR    []Jt manipulation:Gr I [] II []  III [] IV[] V[]  Treatment Area:  Cervical spine area in supine--bouts of suboccipital releases, cervical releases, and manual cervical traction; L and R UT and scalene stretches. Rationale:      decrease pain, increase ROM, increase tissue extensibility, decrease trigger points, and increase postural awareness to improve patient's ability to perform ADLs, work tasks, household chores, community mobility, sleep, and recreation/fitness activities with less/no pain. The manual therapy interventions were performed at a separate and distinct time from the therapeutic activities interventions.        min Self Care:     Rationale:    increase ROM, increase strength, improve coordination, and increase proprioception to improve the patients ability to perform ADLs, work tasks, household chores, community mobility, sleep, and recreation/fitness activities with less/no pain.     Billed With/As:   [x] TE   [] TA   [] Neuro   [x] Self Care Patient Education: [x] Review HEP    [x] Progressed/Changed HEP based on:  [x] positioning   [x] body mechanics   [] transfers   [] heat/ice application    [x] other:  Patient instructed in and performed new HEP activities. Other Objective/Functional Measures:     Initially good ROM for bilat shldr flexion with use of seated pulleys, but later decreasing motion and increasing tension. Post Treatment Pain Level (on 0 to 10 scale):   6 / 10      ASSESSMENT     Assessment/Changes in Function:      Pt tolerated manual traction and stretching better. Pt did OK with supine over T-ball, but difficult/need assist to get off ball when finished. []  See Progress Note/Recertification   Patient will continue to benefit from skilled PT services to modify and progress therapeutic interventions, address functional mobility deficits, address ROM deficits, address strength deficits, analyze and address soft tissue restrictions, analyze and cue movement patterns, analyze and modify body mechanics/ergonomics, assess and modify postural abnormalities, and instruct in home and community integration to attain remaining goals. Progress toward goals / Updated goals:     Slow Progress to    [x] STG    [] LTG 1 - 5 as shown by patient reporting and by observed performance of activities during the PT session. PLAN               [x]  Upgrade activities as tolerated YES Continue plan of care   []  Discharge due to :       [x]  Other:    Try US.                 Therapist: KEITH Callahan     Date:  08/18/2022 Time:  3:54 PM

## 2022-08-22 ENCOUNTER — HOSPITAL ENCOUNTER (OUTPATIENT)
Dept: PHYSICAL THERAPY | Age: 27
End: 2022-08-22
Payer: COMMERCIAL

## 2022-08-23 ENCOUNTER — HOSPITAL ENCOUNTER (OUTPATIENT)
Dept: PHYSICAL THERAPY | Age: 27
Discharge: HOME | End: 2022-08-23
Payer: COMMERCIAL

## 2022-08-23 PROCEDURE — 97530 THERAPEUTIC ACTIVITIES: CPT

## 2022-08-23 PROCEDURE — 97140 MANUAL THERAPY 1/> REGIONS: CPT

## 2022-08-23 PROCEDURE — 97110 THERAPEUTIC EXERCISES: CPT

## 2022-08-23 PROCEDURE — 97014 ELECTRIC STIMULATION THERAPY: CPT

## 2022-08-23 NOTE — PROGRESS NOTES
PHYSICAL THERAPY - DAILY TREATMENT NOTE     Patient Name: Dayami Blank        Date: 2022  : 1995   YES Patient  Verified  Visit #:     Insurance: Payor: Alessandra Trinh / Plan: [address] [place] [address] [employer] PPO / Product Type: PPO /      In time: 5:23 pm Out time: 6:16 pm   Total Treatment Time: 53     Medicare/[employer] Time Tracking (below)   Total Timed Codes (min):  - 1:1 Treatment Time:  -     TREATMENT AREA =  Upper back pain [M54.9]    SUBJECTIVE    Pain Level (on 0 to 10 scale):  6  / 10- R CS> L   Medication Changes/New allergies or changes in medical history, any new surgeries or procedures?     NO    If yes, update Summary List   Subjective Functional Status/Changes:  []  No changes reported     Pt reports pain mostly right CS > L CS ; sleeping is uncomfortable ; performing HEP, mainly doing self TrPt release with tennis ball       OBJECTIVE  Modalities Rationale:     decrease edema, decrease inflammation, decrease pain, and increase tissue extensibility to improve patient's ability to perform ADLs with   10 min [x] Estim, type/location: IF CS, UT; semi reclined                                     []  att     [x]  unatt     []  w/US     []  w/ice    [x]  w/heat    min []  Mechanical Traction: type/lbs                   []  pro   []  sup   []  int   []  cont    []  before manual    []  after manual    min []  Ultrasound, settings/location:      min []  Iontophoresis w/ dexamethasone, location:                                               []  take home patch       []  in clinic    min []  Ice     []  Heat    location/position:     min []  Vasopneumatic Device, press/temp:    If using vaso (only need to measure limb vaso being performed on)      pre-treatment girth :       post-treatment girth :       measured at (landmark location) :      min []  Other:    [x] Skin assessment post-treatment (if applicable):    [x]  intact    []  redness- no adverse reaction                  []redness - adverse reaction: 20 min Therapeutic Exercise:  [x]  See flow sheet   Rationale:      increase ROM, increase strength, and increase proprioception to improve the patients ability to perform ADLs with less pain     11 min Therapeutic Activity: [x]  See flow sheet   Rationale:      increase ROM, increase strength, improve coordination, and increase proprioception to improve the patients ability to perform ADLs with less pain       12 min Manual Therapy: Technique:  STM CS, UT; manual traction; TrPt release to same       Rationale:      decrease pain, increase ROM, increase tissue extensibility, decrease trigger points, and increase postural awareness to improve patient's ability to perform ADLs with less pain  The manual therapy interventions were performed at a separate and distinct time from the therapeutic activities interventions. Billed With/As:   [] TE   [] TA   [] Neuro   [] Self Care Patient Education: [x] Review HEP    [] Progressed/Changed HEP based on:   [] positioning   [] body mechanics   [] transfers   [] heat/ice application    [] other:        Other Objective/Functional Measures:    Cervical AROM: FF: 40%-pain R CS, ext:40%-pain CS R>L; RR: 40%-pain R>L; LR: 35%, RSB: 35%, LSB: 50% -stiffness bilaterally  Add CS isometrics     Post Treatment Pain Level (on 0 to 10) scale:   5  / 10     ASSESSMENT    Assessment/Changes in Function:     Pt reporting slight HA after UT, Lev stretching; resolving after session  Increased AROM CS rotation bilaterally with RRIS     []  See Progress Note/Recertification   Patient will continue to benefit from skilled PT services to \\ to attain remaining goals.       Progress toward goals / Updated goals:    Good Progress to    [x] STG    [] LTG  1, 2  as shown by pt self report; improving CS AROM       PLAN    [x]  Upgrade activities as tolerated YES Continue plan of care   []  Discharge due to :    []  Other:      Therapist: Agnes Katz PTA    Date: 8/23/2022 Time: 6:16 PM Future Appointments   Date Time Provider John Cope   8/25/2022  5:15 PM Gerardo Chan PT [facility]

## 2022-08-26 ENCOUNTER — HOSPITAL ENCOUNTER (OUTPATIENT)
Dept: PHYSICAL THERAPY | Age: 27
Discharge: HOME | End: 2022-08-26
Payer: COMMERCIAL

## 2022-08-26 PROCEDURE — 97110 THERAPEUTIC EXERCISES: CPT

## 2022-08-26 PROCEDURE — 97140 MANUAL THERAPY 1/> REGIONS: CPT

## 2022-08-26 PROCEDURE — 97112 NEUROMUSCULAR REEDUCATION: CPT

## 2022-08-26 NOTE — PROGRESS NOTES
PHYSICAL THERAPY - DAILY TREATMENT NOTE    Patient Name: Brittnee Zheng        Date: 2022  : 1995    Patient  Verified: YES  Visit #:      of   12  Insurance: Payor: Irene Thomas / Plan: [place] [employer] [place] / Product Type: PPO /      In time: 1:45 Out time: 2:45   Total Treatment Time: 60     Medicare Time Tracking (below)   Total Timed Codes (min):  - 1:1 Treatment Time:  -     TREATMENT AREA/ DIAGNOSIS = Upper back pain [M54.9]    SUBJECTIVE   Pain Level (on 0 to 10 scale):  6  / 10   Medication Changes/New allergies or changes in medical history, any new surgeries or procedures? NO    If yes, update Summary List   Subjective Functional Status/Changes:  []  No changes reported     Pt reports that his neck is still tight.  Still having discomfort in the scapular area      OBJECTIVE  Modalities Rationale:     decrease inflammation and decrease pain to improve patient's ability to perform ADLs without pain  10 min [x] Estim, type/location: IFC                                     []  att     []  unatt     []  w/US     []  w/ice    [x]  w/heat    min []  Mechanical Traction: type/lbs                   []  pro   []  sup   []  int   []  cont    []  before manual    []  after manual    min []  Ultrasound, settings/location:      min []  Iontophoresis w/ dexamethasone, location:                                               []  take home patch       []  in clinic    min []  Ice     [x]  Heat    location/position:     min []  Vasopneumatic Device, press/temp:    If using vaso (only need to measure limb vaso being performed on)      pre-treatment girth :       post-treatment girth :       measured at (landmark location) :      min []  Other:    [] Skin assessment post-treatment (if applicable):    []  intact    []  redness- no adverse reaction                  []redness - adverse reaction:        20 min Therapeutic Exercise:  [x]  See flow sheet   Rationale:      increase ROM and increase strength to improve the patients ability to perform ADLs without pain     15 min Manual Therapy: STM to c/s   Rationale:      decrease pain, increase ROM, and increase tissue extensibility to improve patient's ability to perform ADLs without pain  The manual therapy interventions were performed at a separate and distinct time from the therapeutic activities interventions    15 min Neuromuscular Re-ed: [x]  See flow sheet   Rationale:    improve coordination, improve balance, and increase proprioception to improve the patients ability to perform ADLs without pain    Billed With/As:   [] TE   [] TA   [] Neuro   [] Self Care Patient Education: [x] Review HEP    [] Progressed/Changed HEP based on:   [] positioning   [] body mechanics   [] transfers   [] heat/ice application    [] other:        Other Objective/Functional Measures:    TTP to c/s. Increased tonicity in the c/s musculature   Post Treatment Pain Level (on 0 to 10) scale:   0  / 10     ASSESSMENT  Assessment/Changes in Function:     Pt required cueing for proper mechanics with exercises      []  See Progress Note/Recertification   Patient will continue to benefit from skilled PT services to modify and progress therapeutic interventions, address functional mobility deficits, address ROM deficits, address strength deficits, and analyze and address soft tissue restrictions to attain remaining goals.    Progress toward goals / Updated goals:    Good Progress to    [] STG    [x] LTG  1 as shown by improved mobility after treatment session      PLAN  [x]  Upgrade activities as tolerated YES Continue plan of care   []  Discharge due to :    []  Other:      Therapist: Juaquin Pandey DPT     Date: 8/26/2022 Time: 7:03 AM        Future Appointments   Date Time Provider John Cope   8/26/2022  1:45 PM Cassandra Lines [facility]

## 2022-08-29 ENCOUNTER — HOSPITAL ENCOUNTER (OUTPATIENT)
Dept: PHYSICAL THERAPY | Age: 27
Discharge: HOME | End: 2022-08-29
Payer: COMMERCIAL

## 2022-08-29 PROCEDURE — 97530 THERAPEUTIC ACTIVITIES: CPT

## 2022-08-29 PROCEDURE — 97014 ELECTRIC STIMULATION THERAPY: CPT

## 2022-08-29 PROCEDURE — 97110 THERAPEUTIC EXERCISES: CPT

## 2022-08-29 PROCEDURE — 97140 MANUAL THERAPY 1/> REGIONS: CPT

## 2022-08-29 NOTE — PROGRESS NOTES
PHYSICAL THERAPY - DAILY TREATMENT NOTE     Patient Name: Christopher Corbett        Date: 2022  : 1995   YES Patient  Verified  Visit #:     Insurance: Payor: Vena Duane / Plan: [place] [employer] / Product Type: PPO /      In time: 9:10 am Out time: 10:00     Total Treatment Time: 50     Medicare/BCBS Time Tracking (below)   Total Timed Codes (min):  - 1:1 Treatment Time:  -     TREATMENT AREA =  Upper back pain [M54.9]    SUBJECTIVE    Pain Level (on 0 to 10 scale):  5  / 10   Medication Changes/New allergies or changes in medical history, any new surgeries or procedures?     NO    If yes, update Summary List   Subjective Functional Status/Changes:  []  No changes reported     Pt reports slight decrease in pain ; tightness @ same  Intermittent HA's persist; slight decrease in intensity         OBJECTIVE  Modalities Rationale:     decrease inflammation, decrease pain, and increase tissue extensibility to improve patient's ability to perform ADLs with less pain  10 min [x] Estim, type/location: [facility] CS, [place]                                     []  att     [x]  unatt     []  w/US     []  w/ice    [x]  w/heat    min []  Mechanical Traction: type/lbs                   []  pro   []  sup   []  int   []  cont    []  before manual    []  after manual    min []  Ultrasound, settings/location:      min []  Iontophoresis w/ dexamethasone, location:                                               []  take home patch       []  in clinic    min []  Ice     []  Heat    location/position:     min []  Vasopneumatic Device, press/temp:    If using vaso (only need to measure limb vaso being performed on)      pre-treatment girth :       post-treatment girth :       measured at (landmark location) :      min []  Other:    [x] Skin assessment post-treatment (if applicable):    [x]  intact    []  redness- no adverse reaction                  []redness - adverse reaction:      15 min Therapeutic Exercise:  [x]  See flow sheet Rationale:      increase ROM, increase strength, and improve coordination to improve the patients ability to perform ADLs with less pain     10 min Therapeutic Activity: [x]  Review neutral spine positioning in sitting and standing; use of towel roll   Rationale:      increase ROM and increase strength to improve the patients ability to perform ADLs with less pain       15 min Manual Therapy: Technique:       STM with TrPt release and gentle stretching to CS, UT   Rationale:      decrease pain, increase ROM, increase tissue extensibility, and decrease trigger points to improve patient's ability to perform ADLs with less pain  The manual therapy interventions were performed at a separate and distinct time from the therapeutic activities interventions. Billed With/As:   [] TE   [] TA   [] Neuro   [] Self Care Patient Education: [x] Review HEP    [] Progressed/Changed HEP based on:   [] positioning   [] body mechanics   [] transfers   [] heat/ice application    [] other:        Other Objective/Functional Measures:    Review HEP     Post Treatment Pain Level (on 0 to 10) scale:   4  / 10     ASSESSMENT    Assessment/Changes in Function:     Slow steady progress with AROM and decreasing pain;  TTP, increased ms tone noted in R >L CS,UT     []  See Progress Note/Recertification   Patient will continue to benefit from skilled PT services to modify and progress therapeutic interventions, address functional mobility deficits, address ROM deficits, address strength deficits, analyze and address soft tissue restrictions, analyze and cue movement patterns, analyze and modify body mechanics/ergonomics, and instruct in home and community integration to attain remaining goals.       Progress toward goals / Updated goals:    Good Progress to    [x] STG    [] LTG  1-3 as shown by improving CS AROM, decreasing ms guarding with positioning changes       PLAN    [x]  Upgrade activities as tolerated YES Continue plan of care []  Discharge due to :    []  Other:      Therapist: Rosalie Murrieta PTA    Date: 8/29/2022 Time: 10:00 AM     Future Appointments   Date Time Provider John Cope   8/31/2022  5:15 PM Manuella Mcgill [facility]   9/7/2022  5:15 PM Manuella Mcgill [facility]   9/8/2022  6:00 PM Kt Vázquez PTA [facility]

## 2022-08-31 ENCOUNTER — HOSPITAL ENCOUNTER (OUTPATIENT)
Dept: PHYSICAL THERAPY | Age: 27
Discharge: HOME | End: 2022-08-31
Payer: COMMERCIAL

## 2022-08-31 PROCEDURE — 97140 MANUAL THERAPY 1/> REGIONS: CPT

## 2022-08-31 PROCEDURE — 97112 NEUROMUSCULAR REEDUCATION: CPT

## 2022-08-31 PROCEDURE — 97110 THERAPEUTIC EXERCISES: CPT

## 2022-08-31 PROCEDURE — 97014 ELECTRIC STIMULATION THERAPY: CPT

## 2022-08-31 NOTE — PROGRESS NOTES
PHYSICAL THERAPY - DAILY TREATMENT NOTE    Patient Name: Saira Vegas        Date: 2022  : 1995    Patient  Verified: YES  Visit #:     Insurance: Payor: Faraz Anne / Plan: [employer] PPO / Product Type: PPO /      In time: 5:20 Out time: 6:10   Total Treatment Time: 50     Medicare Time Tracking (below)   Total Timed Codes (min):  - 1:1 Treatment Time:  -     TREATMENT AREA/ DIAGNOSIS = Upper back pain [M54.9]    SUBJECTIVE   Pain Level (on 0 to 10 scale):  4  / 10   Medication Changes/New allergies or changes in medical history, any new surgeries or procedures?     NO    If yes, update Summary List   Subjective Functional Status/Changes:  []  No changes reported     Pt reports still having R sided neck pain into the shoulder blade       OBJECTIVE  Modalities Rationale:     decrease pain and increase tissue extensibility to improve patient's ability to perform ADLs without pain  10 min [x] Estim, type/location:  IFC to c/s                                    []  att     []  unatt     []  w/US     []  w/ice    [x]  w/heat    min []  Mechanical Traction: type/lbs                   []  pro   []  sup   []  int   []  cont    []  before manual    []  after manual    min []  Ultrasound, settings/location:      min []  Iontophoresis w/ dexamethasone, location:                                               []  take home patch       []  in clinic    min []  Ice     [x]  Heat    location/position:     min []  Vasopneumatic Device, press/temp:    If using vaso (only need to measure limb vaso being performed on)      pre-treatment girth :       post-treatment girth :       measured at (landmark location) :      min []  Other:    [] Skin assessment post-treatment (if applicable):    []  intact    []  redness- no adverse reaction                  []redness - adverse reaction:        15 min Therapeutic Exercise:  [x]  See flow sheet   Rationale:      increase strength to improve the patients ability to perform ADLs without pain     15 min Manual Therapy: Stm to c/s. PA mobs to T/S   Rationale:      decrease pain, increase ROM, and increase tissue extensibility to improve patient's ability to perform ADLs without pain  The manual therapy interventions were performed at a separate and distinct time from the therapeutic activities interventions    10 min Neuromuscular Re-ed: [x]  See flow sheet   Rationale:    improve coordination, improve balance, and increase proprioception to improve the patients ability to perform ADLs without pain    Billed With/As:   [x] TE   [] TA   [] Neuro   [] Self Care Patient Education: [x] Review HEP    [] Progressed/Changed HEP based on:   [] positioning   [] body mechanics   [] transfers   [] heat/ice application    [] other:        Other Objective/Functional Measures:    TTP to R sided c/s musculature    Post Treatment Pain Level (on 0 to 10) scale:   0  / 10     ASSESSMENT  Assessment/Changes in Function:     Pt showing improved overall pain with ADLs. Required less cueing for proper mechanics with exercises       []  See Progress Note/Recertification   Patient will continue to benefit from skilled PT services to modify and progress therapeutic interventions, address functional mobility deficits, address ROM deficits, address strength deficits, analyze and address soft tissue restrictions, and analyze and cue movement patterns to attain remaining goals.    Progress toward goals / Updated goals:    Good Progress to    [] STG    [x] LTG  1 as shown by improved overall pain levels with ADLs     PLAN  [x]  Upgrade activities as tolerated YES Continue plan of care   []  Discharge due to :    []  Other:      Therapist: Alok Martin DPT     Date: 8/31/2022 Time: 8:50 AM        Future Appointments   Date Time Provider John Cope   8/31/2022  5:15 PM Wilder Coss [facility]   9/7/2022  5:15 PM Wilder Coss [facility]   9/8/2022  6:00 PM Felipe Malone PTA [facility]

## 2022-09-07 ENCOUNTER — HOSPITAL ENCOUNTER (OUTPATIENT)
Dept: PHYSICAL THERAPY | Age: 27
Discharge: HOME | End: 2022-09-07
Payer: COMMERCIAL

## 2022-09-07 PROCEDURE — 97014 ELECTRIC STIMULATION THERAPY: CPT

## 2022-09-07 PROCEDURE — 97112 NEUROMUSCULAR REEDUCATION: CPT

## 2022-09-07 PROCEDURE — 97140 MANUAL THERAPY 1/> REGIONS: CPT

## 2022-09-07 PROCEDURE — 97110 THERAPEUTIC EXERCISES: CPT

## 2022-09-07 NOTE — PROGRESS NOTES
PHYSICAL THERAPY - DAILY TREATMENT NOTE    Patient Name: Janet Pettit        Date: 2022  : 1995    Patient  Verified: YES  Visit #:     Insurance: Payor: Ade Juárez / Plan: [employer] / Product Type: PPO /      In time: 5:20 Out time: 6:10   Total Treatment Time: 50     Medicare Time Tracking (below)   Total Timed Codes (min):  - 1:1 Treatment Time:  -     TREATMENT AREA/ DIAGNOSIS = Upper back pain [M54.9]    SUBJECTIVE   Pain Level (on 0 to 10 scale):  4  / 10   Medication Changes/New allergies or changes in medical history, any new surgeries or procedures?     NO    If yes, update Summary List   Subjective Functional Status/Changes:  []  No changes reported     See PN      OBJECTIVE  Modalities Rationale:     decrease inflammation, decrease pain, and increase tissue extensibility to improve patient's ability to perform ADLs without pain    10 min [x] Estim, type/location: [facility] to c/s with MHP                                     []  att     []  unatt     []  w/US     []  w/ice    []  w/heat    min []  Mechanical Traction: type/lbs                   []  pro   []  sup   []  int   []  cont    []  before manual    []  after manual    min []  Ultrasound, settings/location:      min []  Iontophoresis w/ dexamethasone, location:                                               []  take home patch       []  in clinic    min []  Ice     []  Heat    location/position:     min []  Vasopneumatic Device, press/temp:    If using vaso (only need to measure limb vaso being performed on)      pre-treatment girth :       post-treatment girth :       measured at (landmark location) :      min []  Other:    [] Skin assessment post-treatment (if applicable):    []  intact    []  redness- no adverse reaction                  []redness - adverse reaction:        15 min Therapeutic Exercise:  [x]  See flow sheet   Rationale:      increase ROM and increase strength to improve the patients ability to perform ADLs without pain     15 min Manual Therapy: STM to c/s. Passive upper trap stretching   Rationale:      increase ROM and increase tissue extensibility to improve patient's ability to perform ADLs without pain  The manual therapy interventions were performed at a separate and distinct time from the therapeutic activities interventions      10 min Neuromuscular Re-ed: [x]  See flow sheet   Rationale:    improve coordination, improve balance, and increase proprioception to improve the patients ability to perform ADLs without pain    Billed With/As:   [x] TE   [] TA   [] Neuro   [] Self Care Patient Education: [x] Review HEP    [] Progressed/Changed HEP based on:   [] positioning   [] body mechanics   [] transfers   [] heat/ice application    [] other:        Other Objective/Functional Measures:    See PN   Post Treatment Pain Level (on 0 to 10) scale:   4 / 10     ASSESSMENT  Assessment/Changes in Function:     See PN     []  See Progress Note/Recertification   Patient will continue to benefit from skilled PT services to modify and progress therapeutic interventions, address functional mobility deficits, address ROM deficits, address strength deficits, analyze and address soft tissue restrictions, and analyze and cue movement patterns to attain remaining goals.    Progress toward goals / Updated goals:    See PN     PLAN  [x]  Upgrade activities as tolerated YES Continue plan of care   []  Discharge due to :    []  Other:      Therapist: Larissa Jurado DPT     Date: 9/7/2022 Time: 3:16 PM        Future Appointments   Date Time Provider John Cope   9/7/2022  5:15 PM Josey Mancera [facility]   9/8/2022  6:00 PM Caroline Dumont PTA [facility]

## 2022-09-07 NOTE — PROGRESS NOTES
[facility] AT [facility] [address] [place], [address]  Phone: [phone]  Fax: [phone]  PROGRESS NOTE  Patient Name: Stacey Sanders : 1995   Treatment/Medical Diagnosis: Upper back pain [M54.9]   Referral Source: Lorenzo Anton, *     Date of Initial Visit: 2022 Attended Visits: 8 Missed Visits: -     SUMMARY OF TREATMENT  PT consisted of manual therapy techniques, therapeutic exercises, and modalities to improve strength, improve mobility, decrease pain, and improve overall function. CURRENT STATUS  Pt reports still having pain with ADLs. Pt states that his overall symptoms have significantly gotten better but still there. ROM WFL. MMT WFL. Pt still having decreased mobility in T/S. Pt also having TTP to R upper trap, scalenes, and levator scap. Goal/Measure of Progress Goal Met? 1. The patient will be independent in HEP for long-term management of symptoms   Status at last Eval: Not ind Current Status: Not ind, still progressing no   2. Improve functional ability as evidenced by a score of > or = 76/100 on FOTO   Status at last Eval: 54 Current Status: 61 progressing   3. No/minimal tenderness and/or hypertonicity to palpation. Status at last Eval: TTP Current Status: TTP no   4. Increase cervical, scapular, and shoulder MMT scores to >/= 5-/5 with no/minimal discomfort for resistive testing. Status at last Eval: See Eval Current Status: WFL yes     New Goals to be achieved in __4__  weeks:  1. Continue unmet goals above   2.  -   3.  -   4.  -       RECOMMENDATIONS  Pt to continue PT 1-2x a week for 3-4 more weeks to continue to improve overall pain with ADLs. If you have any questions/comments please contact us directly at [phone]. Thank you for allowing us to assist in the care of your patient.     Therapist Signature: Arturo Lomeli Date: 2022     Time: 3:16 PM   NOTE TO PHYSICIAN:  PLEASE COMPLETE THE ORDERS BELOW AND FAX TO   [facility] at [place]: [phone]. If you are unable to process this request in 24 hours please contact our office: [phone].    ___ I have read the above report and request that my patient continue as recommended.   ___ I have read the above report and request that my patient continue therapy with the following changes/special instructions:_________________________________________________________   ___ I have read the above report and request that my patient be discharged from therapy.      Physician Signature:        Date:       Time:        Gulshan Fischer, *

## 2022-09-08 ENCOUNTER — HOSPITAL ENCOUNTER (OUTPATIENT)
Dept: PHYSICAL THERAPY | Age: 27
End: 2022-09-08
Payer: COMMERCIAL

## 2022-09-12 ENCOUNTER — APPOINTMENT (OUTPATIENT)
Dept: PHYSICAL THERAPY | Age: 27
End: 2022-09-12
Payer: COMMERCIAL

## 2022-09-14 ENCOUNTER — APPOINTMENT (OUTPATIENT)
Dept: PHYSICAL THERAPY | Age: 27
End: 2022-09-14
Payer: COMMERCIAL

## 2022-09-14 ENCOUNTER — HOSPITAL ENCOUNTER (OUTPATIENT)
Dept: PHYSICAL THERAPY | Age: 27
Discharge: HOME | End: 2022-09-14
Payer: COMMERCIAL

## 2022-09-14 PROCEDURE — 97140 MANUAL THERAPY 1/> REGIONS: CPT

## 2022-09-14 PROCEDURE — 97014 ELECTRIC STIMULATION THERAPY: CPT

## 2022-09-14 PROCEDURE — 97110 THERAPEUTIC EXERCISES: CPT

## 2022-09-14 PROCEDURE — 97112 NEUROMUSCULAR REEDUCATION: CPT

## 2022-09-14 NOTE — PROGRESS NOTES
PHYSICAL THERAPY - DAILY TREATMENT NOTE    Patient Name: Isidra Walton        Date: 2022  : 1995    Patient  Verified: YES  Visit #:     Insurance: Payor: Teddy Bennett / Plan: [employer] PPO / Product Type: PPO /      In time: 5:30 Out time: 6:30   Total Treatment Time: 60     Medicare Time Tracking (below)   Total Timed Codes (min):  - 1:1 Treatment Time:  -     TREATMENT AREA/ DIAGNOSIS = Upper back pain [M54.9]    SUBJECTIVE   Pain Level (on 0 to 10 scale):  3  / 10   Medication Changes/New allergies or changes in medical history, any new surgeries or procedures?     NO    If yes, update Summary List   Subjective Functional Status/Changes:  []  No changes reported     Pt reports pain with looking each direction at end range      OBJECTIVE  Modalities Rationale:     decrease inflammation and decrease pain to improve patient's ability to perform ADLs without pain  10 min [x] Estim, type/location:  IFC to c/s                                    []  att     []  unatt     []  w/US     []  w/ice    []  w/heat    min []  Mechanical Traction: type/lbs                   []  pro   []  sup   []  int   []  cont    []  before manual    []  after manual    min []  Ultrasound, settings/location:      min []  Iontophoresis w/ dexamethasone, location:                                               []  take home patch       []  in clinic   - min []  Ice     [x]  Heat    location/position:     min []  Vasopneumatic Device, press/temp:    If using vaso (only need to measure limb vaso being performed on)      pre-treatment girth :       post-treatment girth :       measured at (landmark location) :      min []  Other:    [] Skin assessment post-treatment (if applicable):    []  intact    []  redness- no adverse reaction                  []redness - adverse reaction:        20 min Therapeutic Exercise:  [x]  See flow sheet   Rationale:      increase ROM and increase strength to improve the patients ability to perform ADLs without pain       15 min Manual Therapy: STM to c/s   Rationale:      decrease pain, increase ROM, and increase tissue extensibility to improve patient's ability to perform ADLs without pain  The manual therapy interventions were performed at a separate and distinct time from the therapeutic activities interventions        15 min Neuromuscular Re-ed: [x]  See flow sheet   Rationale:    improve coordination and increase proprioception to improve the patients ability to perform ADLs without pain        Other Objective/Functional Measures:    TTP to r c/s paraspinals   Post Treatment Pain Level (on 0 to 10) scale:   0  / 10     ASSESSMENT  Assessment/Changes in Function:     Pt required cueing for proper mechanics     []  See Progress Note/Recertification   Patient will continue to benefit from skilled PT services to modify and progress therapeutic interventions, address functional mobility deficits, address ROM deficits, address strength deficits, analyze and address soft tissue restrictions, analyze and cue movement patterns, and analyze and modify body mechanics/ergonomics to attain remaining goals.    Progress toward goals / Updated goals:    Good Progress to    [] STG    [x] LTG  1 as shown by improved overall pain with ADLs     PLAN  [x]  Upgrade activities as tolerated YES Continue plan of care   []  Discharge due to :    []  Other:      Therapist: Manjit Paz DPT     Date: 9/14/2022 Time: 6:01 PM        Future Appointments   Date Time Provider John Cope   9/15/2022  4:30 PM Sagrario Mora PTA [facility]

## 2022-09-15 ENCOUNTER — HOSPITAL ENCOUNTER (OUTPATIENT)
Dept: PHYSICAL THERAPY | Age: 27
Discharge: HOME | End: 2022-09-15
Payer: COMMERCIAL

## 2022-09-15 PROCEDURE — 97112 NEUROMUSCULAR REEDUCATION: CPT

## 2022-09-15 PROCEDURE — 97140 MANUAL THERAPY 1/> REGIONS: CPT

## 2022-09-15 PROCEDURE — 97110 THERAPEUTIC EXERCISES: CPT

## 2022-09-15 PROCEDURE — 97014 ELECTRIC STIMULATION THERAPY: CPT

## 2022-09-15 NOTE — PROGRESS NOTES
PHYSICAL THERAPY - DAILY TREATMENT NOTE     Patient Name: Garcia Colunga        Date: 9/15/2022  : 1995   YES Patient  Verified  Visit #:   10   of   12  Insurance: Payor: Aram Richardson / Plan: [place] / Product Type: PPO /      In time: 6:08 pm Out time: 7:06 pm   Total Treatment Time: 58     Medicare/[facility] Time Tracking (below)   Total Timed Codes (min):  - 1:1 Treatment Time:  -     TREATMENT AREA =  Upper back pain [M54.9]    SUBJECTIVE    Pain Level (on 0 to 10 scale):  3  / 10   Medication Changes/New allergies or changes in medical history, any new surgeries or procedures? NO    If yes, update Summary List   Subjective Functional Status/Changes:  []  No changes reported     Pain relief after PT a couple  hours. Doing HEP dialy  Pain range 3 to 4/10   No HA's this week. ~1 HA last week.  Not lasting too long       OBJECTIVE  Modalities Rationale:     decrease edema, decrease inflammation, decrease pain, and increase tissue extensibility to improve patient's ability to perform ADLs with less pain  10 min [x] Estim, type/location: CS UT                                     []  att     [x]  unatt     []  w/US     []  w/ice    [x]  w/heat    min []  Mechanical Traction: type/lbs                   []  pro   []  sup   []  int   []  cont    []  before manual    []  after manual    min []  Ultrasound, settings/location:      min []  Iontophoresis w/ dexamethasone, location:                                               []  take home patch       []  in clinic    min []  Ice     []  Heat    location/position:     min []  Vasopneumatic Device, press/temp:    If using vaso (only need to measure limb vaso being performed on)      pre-treatment girth :       post-treatment girth :       measured at (landmark location) :      min []  Other:    [x] Skin assessment post-treatment (if applicable):    [x]  intact    []  redness- no adverse reaction                  []redness - adverse reaction:      20 min Therapeutic Exercise:  [x]  See flow sheet   Rationale:      increase ROM, increase strength, and improve balance to improve the patients ability to perform ADLs with less pain         16 min Neuromuscular Re-ed: [x]  See flow sheet   Rationale:      increase ROM, increase strength, improve coordination, and increase proprioception to improve the patients ability to perform ADLs with less pain     12 min Manual Therapy: Technique:      STM to c/s. Passive upper trap stretching   Rationale:      decrease pain, increase ROM, increase tissue extensibility, and decrease trigger points to improve patient's ability to perform ADLs with less pain  The manual therapy interventions were performed at a separate and distinct time from the therapeutic activities interventions. Billed With/As:   [] TE   [] TA   [] Neuro   [] Self Care Patient Education: [x] Review HEP    [] Progressed/Changed HEP based on:   [] positioning   [] body mechanics   [] transfers   [] heat/ice application    [] other:        Other Objective/Functional Measures:    No ex progression. Pt challenged by current program     Post Treatment Pain Level (on 0 to 10) scale:   0  / 10     ASSESSMENT    Assessment/Changes in Function:     TTP persists R CS, UT, Lev     []  See Progress Note/Recertification   Patient will continue to benefit from skilled PT services to modify and progress therapeutic interventions, address functional mobility deficits, address ROM deficits, address strength deficits, analyze and address soft tissue restrictions, analyze and cue movement patterns, analyze and modify body mechanics/ergonomics, assess and modify postural abnormalities, and instruct in home and community integration to attain remaining goals.       Progress toward goals / Updated goals:    Good Progress to    [] STG    [x] LTG all LTGs currently in progress; mild fatigue with ex       PLAN    [x]  Upgrade activities as tolerated YES Continue plan of care   [] Discharge due to :    []  Other:      Therapist: Heidy Snyder PTA    Date: 9/15/2022 Time: 7:06 PM   No future appointments.

## 2022-09-21 ENCOUNTER — HOSPITAL ENCOUNTER (OUTPATIENT)
Dept: PHYSICAL THERAPY | Age: 27
Discharge: HOME | End: 2022-09-21
Payer: COMMERCIAL

## 2022-09-21 PROCEDURE — 97112 NEUROMUSCULAR REEDUCATION: CPT

## 2022-09-21 PROCEDURE — 97110 THERAPEUTIC EXERCISES: CPT

## 2022-09-21 PROCEDURE — 97014 ELECTRIC STIMULATION THERAPY: CPT

## 2022-09-21 PROCEDURE — 97140 MANUAL THERAPY 1/> REGIONS: CPT

## 2022-09-21 NOTE — PROGRESS NOTES
PHYSICAL THERAPY - DAILY TREATMENT NOTE    Patient Name: Clara Barahona        Date: 2022  : 1995    Patient  Verified: YES  Visit #:   90   of   16  Insurance: Payor: [employer] / Plan: [place] [employer] / Product Type: PPO /      In time: 5:20 Out time: 6:15   Total Treatment Time: 55     Medicare Time Tracking (below)   Total Timed Codes (min):  - 1:1 Treatment Time:  -     TREATMENT AREA/ DIAGNOSIS = Upper back pain [M54.9]    SUBJECTIVE   Pain Level (on 0 to 10 scale):  3  / 10   Medication Changes/New allergies or changes in medical history, any new surgeries or procedures?     NO    If yes, update Summary List   Subjective Functional Status/Changes:  []  No changes reported     Pt reports having tightness still on the R side of the neck       OBJECTIVE  Modalities Rationale:     decrease inflammation and decrease pain to improve patient's ability to perform ADLs without pain  10 min [x] Estim, type/location:  IFC                                    []  att     []  unatt     []  w/US     []  w/ice    [x]  w/heat    min []  Mechanical Traction: type/lbs                   []  pro   []  sup   []  int   []  cont    []  before manual    []  after manual    min []  Ultrasound, settings/location:      min []  Iontophoresis w/ dexamethasone, location:                                               []  take home patch       []  in clinic    min []  Ice     []  Heat    location/position:     min []  Vasopneumatic Device, press/temp:    If using vaso (only need to measure limb vaso being performed on)      pre-treatment girth :       post-treatment girth :       measured at (landmark location) :      min []  Other:    [] Skin assessment post-treatment (if applicable):    []  intact    []  redness- no adverse reaction                  []redness - adverse reaction:        15 min Therapeutic Exercise:  [x]  See flow sheet   Rationale:      increase ROM and increase strength to improve the patients ability to perform ADLs without pain     15 min Manual Therapy:    Rationale:      increase ROM and increase tissue extensibility to improve patient's ability to perform ADLs without pain  The manual therapy interventions were performed at a separate and distinct time from the therapeutic activities interventions    15 min Neuromuscular Re-ed: [x]  See flow sheet   Rationale:    improve balance and increase proprioception to improve the patients ability to perform ADLs without pain    Billed With/As:   [x] TE   [] TA   [] Neuro   [] Self Care Patient Education: [x] Review HEP    [] Progressed/Changed HEP based on:   [] positioning   [] body mechanics   [] transfers   [] heat/ice application    [] other:        Other Objective/Functional Measures:    TTP to c/s musculature   Post Treatment Pain Level (on 0 to 10) scale:   0  / 10     ASSESSMENT  Assessment/Changes in Function:     Pt showing improved overall scapulohumeral rhythm. Pt having less overall pain with ADLs     []  See Progress Note/Recertification   Patient will continue to benefit from skilled PT services to modify and progress therapeutic interventions, address functional mobility deficits, address ROM deficits, address strength deficits, and analyze and address soft tissue restrictions to attain remaining goals.    Progress toward goals / Updated goals:    Good Progress to    [] STG    [x] LTG  1 as shown by improved overall pain levels with ADLs     PLAN  [x]  Upgrade activities as tolerated YES Continue plan of care   []  Discharge due to :    []  Other:      Therapist: Javid Chávez DPT     Date: 9/21/2022 Time: 2:29 PM        Future Appointments   Date Time Provider John Cope   9/21/2022  5:15 PM Mare Mage [facility]   9/22/2022  6:00 PM Obadiah Arabella, PTA [facility]   9/27/2022  5:15 PM Obadiah [place], PTA [facility]   9/29/2022  5:15 PM Obadiah Arabella, PTA [facility]   10/5/2022  5:20 PM Vickie Mage [facility]   10/6/2022  5:20 PM Willis Wong, PTA [facility]

## 2022-09-22 ENCOUNTER — HOSPITAL ENCOUNTER (OUTPATIENT)
Dept: PHYSICAL THERAPY | Age: 27
Discharge: HOME | End: 2022-09-22
Payer: COMMERCIAL

## 2022-09-22 PROCEDURE — 97140 MANUAL THERAPY 1/> REGIONS: CPT

## 2022-09-22 PROCEDURE — 97112 NEUROMUSCULAR REEDUCATION: CPT

## 2022-09-22 PROCEDURE — 97014 ELECTRIC STIMULATION THERAPY: CPT

## 2022-09-22 PROCEDURE — 97110 THERAPEUTIC EXERCISES: CPT

## 2022-09-22 NOTE — PROGRESS NOTES
PHYSICAL THERAPY - DAILY TREATMENT NOTE     Patient Name: Gerson Fortune        Date: 2022  : 1995   YES Patient  Verified  Visit #:     Insurance: Payor: [employer] / Plan: [place] [employer] PPO / Product Type: PPO /      In time: 6:03 pm Out time: 6:54   Total Treatment Time: 51     Medicare/[facility] Time Tracking (below)   Total Timed Codes (min):  - 1:1 Treatment Time:  -     TREATMENT AREA =  Upper back pain [M54.9]    SUBJECTIVE    Pain Level (on 0 to 10 scale):  2  / 10   Medication Changes/New allergies or changes in medical history, any new surgeries or procedures?     NO    If yes, update Summary List   Subjective Functional Status/Changes:  []  No changes reported   Pain in right upper right trap  Increased soreness with lifting, turning head for driving       OBJECTIVE  Modalities Rationale:     decrease edema, decrease inflammation, decrease pain, and increase tissue extensibility to improve patient's ability to perform ADLs with less pain  10 min [x] Estim, type/location: IFC                                     []  att     [x]  unatt     []  w/US     []  w/ice    [x]  w/heat    min []  Mechanical Traction: type/lbs                   []  pro   []  sup   []  int   []  cont    []  before manual    []  after manual    min []  Ultrasound, settings/location:      min []  Iontophoresis w/ dexamethasone, location:                                               []  take home patch       []  in clinic    min []  Ice     []  Heat    location/position:     min []  Vasopneumatic Device, press/temp:    If using vaso (only need to measure limb vaso being performed on)      pre-treatment girth :       post-treatment girth :       measured at (landmark location) :      min []  Other:    [x] Skin assessment post-treatment (if applicable):    [x]  intact    []  redness- no adverse reaction                  []redness - adverse reaction:      21 min Therapeutic Exercise:  [x]  See flow sheet   Rationale: increase ROM, increase strength, improve balance, and increase proprioception to improve the patients ability to perofmr ADLs with less pain         10 min Neuromuscular Re-ed: [x]  See flow sheet   Rationale:      increase ROM, increase strength, improve coordination, and increase proprioception to improve the patients ability to perform ADLs with less pain     10 min Manual Therapy: Technique:        STM to c/s/ UT; TrPt release to same   Rationale:      decrease pain, increase ROM, increase tissue extensibility, and decrease trigger points to improve patient's ability to improve tissue mobility in functional ADLs  The manual therapy interventions were performed at a separate and distinct time from the therapeutic activities interventions. Billed With/As:   [] TE   [] TA   [] Neuro   [] Self Care Patient Education: [x] Review HEP    [] Progressed/Changed HEP based on:   [] positioning   [] body mechanics   [] transfers   [] heat/ice application    [] other:        Other Objective/Functional Measures: Add open book     Post Treatment Pain Level (on 0 to 10) scale:   0  / 10     ASSESSMENT    Assessment/Changes in Function:     Mild fatigue/soreness with tband exercise  TrPt tenderness persists R UT     []  See Progress Note/Recertification   Patient will continue to benefit from skilled PT services to modify and progress therapeutic interventions, address functional mobility deficits, address ROM deficits, address strength deficits, analyze and address soft tissue restrictions, analyze and cue movement patterns, and instruct in home and community integration to attain remaining goals. Progress toward goals / Updated goals:    Continue toward all updated LTGs.        PLAN    [x]  Upgrade activities as tolerated YES Continue plan of care   []  Discharge due to :    []  Other:      Therapist: Jessica Corona PTA    Date: 9/22/2022 Time: 6: 47 PM     Future Appointments   Date Time Provider Department [place]   9/27/2022  5:15 PM Isha Dionisio [facility]   9/29/2022  5:15 PM Isha Dionisio, PTA [facility]   10/5/2022  5:20 PM Ava Pisanoie [facility]   10/6/2022  5:20 PM Isha Dionisio, PTA [facility]

## 2022-09-27 ENCOUNTER — HOSPITAL ENCOUNTER (OUTPATIENT)
Dept: PHYSICAL THERAPY | Age: 27
Discharge: HOME | End: 2022-09-27
Payer: COMMERCIAL

## 2022-09-27 PROCEDURE — 97014 ELECTRIC STIMULATION THERAPY: CPT

## 2022-09-27 PROCEDURE — 97140 MANUAL THERAPY 1/> REGIONS: CPT

## 2022-09-27 PROCEDURE — 97110 THERAPEUTIC EXERCISES: CPT

## 2022-09-27 PROCEDURE — 97535 SELF CARE MNGMENT TRAINING: CPT

## 2022-09-27 NOTE — PROGRESS NOTES
[facility] PHYSICAL THERAPY AT [address], [address] - Phone: [phone] Fax: [phone]  PROGRESS NOTE  Patient Name: Calin Tan : 1995   Treatment/Medical Diagnosis: Upper back pain [M54.9]   Referral Source: Veronika Tee, *     Date of Initial Visit: 2022 Attended Visits: 12 Missed Visits: 2     SUMMARY OF TREATMENT  Physical Therapy treatment has consisted of therapeutic exercise, therapeutic activities, and modalities to improve strength, ROM, mobility, decrease pain in ADLs, and to improve overall function in ADLs. CURRENT STATUS  Pain and in right upper right trap  Increased soreness with lifting, turning head for driving  Pt reporting overall symptoms have improved but are still there. Pain level: 2/10  Pain and TTP persists in right upper right trap, scalenes, and levator scap. Goal/Measure of Progress Goal Met? 1. The patient will be independent in HEP for long-term management of symptoms   Status at last Eval: Not ind Current Status: Not ind, still progressing Goal in progress   2. Improve functional ability as evidenced by a score of > or = 76/100 on FOTO   Status at last Eval: 54 Current Status: 61 Goal in progress   3. No/minimal tenderness and/or hypertonicity to palpation. Status at last Eval: TTP Current Status: TTP no   4. Increase cervical, scapular, and shoulder MMT scores to >/= 5-/5 with no/minimal discomfort for resistive testing. Status at last Eval: See Eval Current Status: WFL yes     New Goals to be achieved in __4__  weeks:  Plan to continue toward all unmet goals     RECOMMENDATIONS  Plan to continue 1-2x per week x 3-4 weeks. If you have any questions/comments please contact us directly at  [phone]  Thank you for allowing us to assist in the care of your patient.   LPTA Signature: Heena Garrison PTA  Date: 2022   PT Signature:  Time: 9:38 AM NOTE TO PHYSICIAN:  PLEASE COMPLETE THE ORDERS BELOW AND FAX TO   [facility] Physical Therapy: [phone]  If you are unable to process this request in 24 hours please contact our office:  [phone].    ___ I have read the above report and request that my patient continue as recommended.   ___ I have read the above report and request that my patient continue therapy with the following changes/special instructions:_________________________________________________________   ___ I have read the above report and request that my patient be discharged from therapy.      Physician Signature:        Date:       Time:

## 2022-09-27 NOTE — PROGRESS NOTES
PHYSICAL THERAPY - DAILY TREATMENT NOTE     Patient Name: Enmanuel Skinner        Date: 2022  : 1995   YES Patient  Verified  Visit #:   (13 ) 1  of   8  Insurance: Payor: Nader Sal / Plan: [place] [employer] PPO / Product Type: PPO /      In time: 5:25 pm Out time: 6:20   Total Treatment Time: 55     Medicare/[facility] Time Tracking (below)   Total Timed Codes (min):  - 1:1 Treatment Time:  -     TREATMENT AREA =  Upper back pain [M54.9]    SUBJECTIVE    Pain Level (on 0 to 10 scale):  2  / 10- R>L UT, lev, scalenes   Medication Changes/New allergies or changes in medical history, any new surgeries or procedures? NO    If yes, update Summary List   Subjective Functional Status/Changes:  []  No changes reported     Pt reports pain has stayed pretty much the same the last couple of weeks  Performing RRIS 1x per day. Overall pain is a lot better.        OBJECTIVE  Modalities Rationale:     decrease inflammation, decrease pain, and increase tissue extensibility to improve patient's ability to perform ADLs with less pain  10 min [x] Estim, type/location: IFC CS, UT                                     []  att     [x]  unatt     []  w/US     []  w/ice    [x]  w/heat    min []  Mechanical Traction: type/lbs                   []  pro   []  sup   []  int   []  cont    []  before manual    []  after manual    min []  Ultrasound, settings/location:      min []  Iontophoresis w/ dexamethasone, location:                                               []  take home patch       []  in clinic    min []  Ice     []  Heat    location/position:     min []  Vasopneumatic Device, press/temp:    If using vaso (only need to measure limb vaso being performed on)      pre-treatment girth :       post-treatment girth :       measured at (landmark location) :      min []  Other:    [] Skin assessment post-treatment (if applicable):    []  intact    []  redness- no adverse reaction                  []redness - adverse reaction:      23 min Therapeutic Exercise:  [x]  See flow sheet   Rationale:      increase ROM, increase strength, improve coordination, and increase proprioception to improve the patients ability to perform ADLs with less pain         12 min Self care [x]  See flow sheet   Rationale:      increase ROM, increase strength, improve coordination, and increase proprioception to improve the patients ability to perform ADLs with less pain     10 min Manual Therapy: Technique:      STM to c/s/ UT; TrPt release to same   Rationale:      decrease pain, increase ROM, increase tissue extensibility, and decrease trigger points to improve patient's ability to perform ADLs with less pain  The manual therapy interventions were performed at a separate and distinct time from the therapeutic activities interventions. Billed With/As:   [] TE   [] TA   [] Neuro   [] Self Care Patient Education: [x] Review HEP    [] Progressed/Changed HEP based on:   [] positioning   [] body mechanics   [] transfers   [] heat/ice application    [] other:        Other Objective/Functional Measures: Add standing shoulder flex, scapt < 90 degree     Post Treatment Pain Level (on 0 to 10) scale:   0  / 10     ASSESSMENT    Assessment/Changes in Function:     Review neutral spine posture in sitting, use of HEP, self monitoring of sx in ADLs for pain management  Increased mm tone muscle tension R>L CS paraspinals, UT, scalenes persist.     []  See Progress Note/Recertification   Patient will continue to benefit from skilled PT services to modify and progress therapeutic interventions, address functional mobility deficits, address ROM deficits, address strength deficits, analyze and address soft tissue restrictions, analyze and cue movement patterns, analyze and modify body mechanics/ergonomics, assess and modify postural abnormalities, and instruct in home and community integration to attain remaining goals.       Progress toward goals / Updated goals:  Continue toward all updated LTGs       PLAN    [x]  Upgrade activities as tolerated YES Continue plan of care   []  Discharge due to :    []  Other:      Therapist: Constanza Soria PTA    Date: 9/27/2022 Time: 6:20 PM     Future Appointments   Date Time Provider John Cope   9/29/2022  5:15 PM Yahir Zuluaga PTA [facility]   10/5/2022  5:20 PM Lang Mcgee [facility]   10/6/2022  5:20 PM Yahir Zuluaga PTA [facility]

## 2022-09-29 ENCOUNTER — HOSPITAL ENCOUNTER (OUTPATIENT)
Dept: PHYSICAL THERAPY | Age: 27
Discharge: HOME | End: 2022-09-29
Payer: COMMERCIAL

## 2022-09-29 PROCEDURE — 97014 ELECTRIC STIMULATION THERAPY: CPT

## 2022-09-29 PROCEDURE — 97140 MANUAL THERAPY 1/> REGIONS: CPT

## 2022-09-29 PROCEDURE — 97110 THERAPEUTIC EXERCISES: CPT

## 2022-09-29 NOTE — PROGRESS NOTES
PHYSICAL THERAPY - DAILY TREATMENT NOTE     Patient Name: Madyson Agustin        Date: 2022  : 1995   YES Patient  Verified  Visit #:   (14) 2   of   8  Insurance: Payor: Tk Will / Plan: [place] [employer] / Product Type: PPO /      In time: 5:20  Out time: 6:05 pm   Total Treatment Time: 45     Medicare/[facility] Time Tracking (below)   Total Timed Codes (min):  - 1:1 Treatment Time:  -     TREATMENT AREA =  Upper back pain [M54.9]    SUBJECTIVE    Pain Level (on 0 to 10 scale):  2  / 10   Medication Changes/New allergies or changes in medical history, any new surgeries or procedures? NO    If yes, update Summary List   Subjective Functional Status/Changes:  []  No changes reported   Pt reports he noticed the last couple days he noticed pain/soreness on left CS/UT ; noted none today  Pt reports today was first day back @ work without lifting restrictions.        OBJECTIVE  Modalities Rationale:     decrease edema, decrease inflammation, decrease pain, and increase tissue extensibility to improve patient's ability to perform ADLs with less pain  10 min [x] Estim, type/location: [facility] CS, [place]                                     []  att     [x]  unatt     []  w/US     []  w/ice    [x]  w/heat    min []  Mechanical Traction: type/lbs                   []  pro   []  sup   []  int   []  cont    []  before manual    []  after manual    min []  Ultrasound, settings/location:      min []  Iontophoresis w/ dexamethasone, location:                                               []  take home patch       []  in clinic    min []  Ice     []  Heat    location/position:     min []  Vasopneumatic Device, press/temp:    If using vaso (only need to measure limb vaso being performed on)      pre-treatment girth :       post-treatment girth :       measured at (landmark location) :      min []  Other:    [x] Skin assessment post-treatment (if applicable):    [x]  intact    []  redness- no adverse reaction []redness - adverse reaction:      25 min Therapeutic Exercise:  [x]  See flow sheet   Rationale:      increase ROM, increase strength, improve coordination, and increase proprioception to improve the patients ability to perform ADLs with less pain       10 min Manual Therapy: Technique:      STM to c/s/ UT; TrPt release to same   Rationale:      decrease pain, increase ROM, increase tissue extensibility, decrease edema , decrease trigger points, and increase postural awareness to improve patient's ability to perform ADLs with less pain  The manual therapy interventions were performed at a separate and distinct time from the therapeutic activities interventions. Billed With/As:   [] TE   [] TA   [] Neuro   [] Self Care Patient Education: [x] Review HEP    [] Progressed/Changed HEP based on:   [] positioning   [] body mechanics   [] transfers   [] heat/ice application    [] other:        Other Objective/Functional Measures:    Decrease ex per flow sheet secondary to muscle soreness     Post Treatment Pain Level (on 0 to 10) scale:   0  / 10     ASSESSMENT    Assessment/Changes in Function:     Mild increased tension with band exercises. Decreased reps per flow sheet  Increased mm tension TrPt tenderness persists BALTAZAR CS paraspinals, UT, scalenes     []  See Progress Note/Recertification   Patient will continue to benefit from skilled PT services to modify and progress therapeutic interventions, address functional mobility deficits, address ROM deficits, address strength deficits, analyze and address soft tissue restrictions, analyze and cue movement patterns, analyze and modify body mechanics/ergonomics, assess and modify postural abnormalities, and instruct in home and community integration to attain remaining goals.       Progress toward goals / Updated goals:    Good Progress to    [] STG    [] LTG  1-2 as shown by pt resuming regular work duties, pt self report        PLAN    [x]  Upgrade activities as tolerated YES Continue plan of care   []  Discharge due to :    []  Other:      Therapist: Armani Butt PTA    Date: 9/29/2022 Time: 6:05 PM     Future Appointments   Date Time Provider John Cope   10/5/2022  5:20 PM Judye Coral [facility]   10/6/2022  5:20 PM Daisy Vaughn PTA [facility]

## 2022-10-05 ENCOUNTER — HOSPITAL ENCOUNTER (OUTPATIENT)
Dept: PHYSICAL THERAPY | Age: 27
Discharge: HOME | End: 2022-10-05
Payer: COMMERCIAL

## 2022-10-05 PROCEDURE — 97110 THERAPEUTIC EXERCISES: CPT

## 2022-10-05 PROCEDURE — 97112 NEUROMUSCULAR REEDUCATION: CPT

## 2022-10-05 PROCEDURE — 97014 ELECTRIC STIMULATION THERAPY: CPT

## 2022-10-05 PROCEDURE — 97140 MANUAL THERAPY 1/> REGIONS: CPT

## 2022-10-05 NOTE — PROGRESS NOTES
PHYSICAL THERAPY - DAILY TREATMENT NOTE    Patient Name: Ryley Martinez        Date: 10/5/2022  : 1995    Patient  Verified: YES  Visit #:     Insurance: Payor: Kalina Pruitt / Plan: [place] [employer] [place] / Product Type: PPO /      In time: 5:20 Out time: 6:20   Total Treatment Time: 60     Medicare Time Tracking (below)   Total Timed Codes (min):  - 1:1 Treatment Time:  -     TREATMENT AREA/ DIAGNOSIS = Upper back pain [M54.9]    SUBJECTIVE   Pain Level (on 0 to 10 scale):  2  / 10   Medication Changes/New allergies or changes in medical history, any new surgeries or procedures? NO    If yes, update Summary List   Subjective Functional Status/Changes:  []  No changes reported     Pt reports having some pain  tightness in the R side of the neck.        OBJECTIVE  Modalities Rationale:     decrease inflammation, decrease pain, and increase tissue extensibility to improve patient's ability to perform ADLs without pain  10 min [] Estim, type/location:  [facility] with heat to neck                                     []  att     []  unatt     []  w/US     []  w/ice    [x]  w/heat    min []  Mechanical Traction: type/lbs                   []  pro   []  sup   []  int   []  cont    []  before manual    []  after manual    min []  Ultrasound, settings/location:      min []  Iontophoresis w/ dexamethasone, location:                                               []  take home patch       []  in clinic    min []  Ice     []  Heat    location/position:     min []  Vasopneumatic Device, press/temp:    If using vaso (only need to measure limb vaso being performed on)      pre-treatment girth :       post-treatment girth :       measured at (landmark location) :      min []  Other:    [] Skin assessment post-treatment (if applicable):    []  intact    []  redness- no adverse reaction                  []redness - adverse reaction:        20 min Therapeutic Exercise:  [x]  See flow sheet   Rationale:      increase ROM, increase strength, and improve coordination to improve the patients ability to perform ADLs without pain     15 min Manual Therapy: STM to c/s. Rationale:      decrease pain, increase ROM, and increase tissue extensibility to improve patient's ability to perform ADLs without pain  The manual therapy interventions were performed at a separate and distinct time from the therapeutic activities interventions       min Therapeutic Activity: [x]  See flow sheet   Rationale:     functional activities and activities   to improve the patients ability to perform ADLs without pain      15 min Neuromuscular Re-ed: [x]  See flow sheet   Rationale:    improve coordination, improve balance, and increase proprioception to improve the patients ability to perform ADLs without pain    Billed With/As:   [x] TE   [] TA   [] Neuro   [] Self Care Patient Education: [x] Review HEP    [] Progressed/Changed HEP based on:   [] positioning   [] body mechanics   [] transfers   [] heat/ice application    [] other:        Other Objective/Functional Measures:    TTP to R side of the neck    Post Treatment Pain Level (on 0 to 10) scale:   0  / 10     ASSESSMENT  Assessment/Changes in Function:     Pt showing improved overall activity tolerance with exercises. Pt also having less pain with ADLs at home.     []  See Progress Note/Recertification   Patient will continue to benefit from skilled PT services to modify and progress therapeutic interventions, address functional mobility deficits, address ROM deficits, address strength deficits, analyze and address soft tissue restrictions, analyze and cue movement patterns, and analyze and modify body mechanics/ergonomics to attain remaining goals. Progress toward goals / Updated goals:    Good Progress to    [] STG    [x] LTG  1 as shown by improved overall pain levels with ADLs.       PLAN  [x]  Upgrade activities as tolerated YES Continue plan of care   []  Discharge due to :    []  Other: Therapist: Crista Mojica DPT     Date: 10/5/2022 Time: 3:00 PM        Future Appointments   Date Time Provider John Cope   10/5/2022  5:20 PM Silva Richardson [facility]   10/6/2022  5:20 PM Bettye Ramsey PTA [facility]

## 2022-10-06 ENCOUNTER — HOSPITAL ENCOUNTER (OUTPATIENT)
Dept: PHYSICAL THERAPY | Age: 27
Discharge: HOME | End: 2022-10-06
Payer: COMMERCIAL

## 2022-10-06 PROCEDURE — 97140 MANUAL THERAPY 1/> REGIONS: CPT

## 2022-10-06 PROCEDURE — 97014 ELECTRIC STIMULATION THERAPY: CPT

## 2022-10-06 PROCEDURE — 97110 THERAPEUTIC EXERCISES: CPT

## 2022-10-06 PROCEDURE — 97112 NEUROMUSCULAR REEDUCATION: CPT

## 2022-10-06 NOTE — PROGRESS NOTES
PHYSICAL THERAPY - DAILY TREATMENT NOTE     Patient Name: Mitzy Espitia        Date: 10/6/2022  : 1995   YES Patient  Verified  Visit #:     Insurance: Payor: Lonnie Hernández / Plan: [place] [employer] P[place] / Product Type: PPO /      In time: 5:20 pm Out time: 6:25 pm   Total Treatment Time: 65     Medicare/[facility] Time Tracking (below)   Total Timed Codes (min):  - 1:1 Treatment Time:  -     TREATMENT AREA =  Upper back pain [M54.9]    SUBJECTIVE    Pain Level (on 0 to 10 scale):  1  / 10   Medication Changes/New allergies or changes in medical history, any new surgeries or procedures?     NO    If yes, update Summary List   Subjective Functional Status/Changes:  []  No changes reported     Pt reports not really pain just sore on R with looking L         OBJECTIVE  Modalities Rationale:     decrease edema, decrease inflammation, decrease pain, and increase tissue extensibility to improve patient's ability to perform ADLs without pain  10 min [x] Estim, type/location: [facility] CS, [place]                                      []  att     [x]  unatt     []  w/US     []  w/ice    [x]  w/heat    min []  Mechanical Traction: type/lbs                   []  pro   []  sup   []  int   []  cont    []  before manual    []  after manual    min []  Ultrasound, settings/location:      min []  Iontophoresis w/ dexamethasone, location:                                               []  take home patch       []  in clinic    min []  Ice     []  Heat    location/position:     min []  Vasopneumatic Device, press/temp:    If using vaso (only need to measure limb vaso being performed on)      pre-treatment girth :       post-treatment girth :       measured at (landmark location) :      min []  Other:    [x] Skin assessment post-treatment (if applicable):    [x]  intact    []  redness- no adverse reaction                  []redness - adverse reaction:      30 min Therapeutic Exercise:  [x]  See flow sheet   Rationale:      increase ROM, increase strength, improve coordination, and increase proprioception to improve the patients ability to perform ADLs without pain     15 min Neuromuscular Re-ed: [x]  See flow sheet   Rationale:      increase ROM, increase strength, improve coordination, and increase proprioception to improve the patients ability to perform ADLs without pain     10 min Manual Therapy: Technique:      STM to c/s/ UT; TrPt release to same   Rationale:      decrease pain, increase ROM, increase tissue extensibility, and decrease trigger points to improve patient's ability to perform ADLs without pain  The manual therapy interventions were performed at a separate and distinct time from the therapeutic activities interventions. Billed With/As:   [] TE   [] TA   [] Neuro   [] Self Care Patient Education: [x] Review HEP    [] Progressed/Changed HEP based on:   [] positioning   [] body mechanics   [] transfers   [] heat/ice application    [] other:        Other Objective/Functional Measures:    Mild pain R UT with AROM LR      Post Treatment Pain Level (on 0 to 10) scale:   0  / 10     ASSESSMENT    Assessment/Changes in Function:     Mild fatigue with TB stabs      []  See Progress Note/Recertification   Patient will continue to benefit from skilled PT services to modify and progress therapeutic interventions, address functional mobility deficits, address ROM deficits, address strength deficits, analyze and address soft tissue restrictions, analyze and cue movement patterns, analyze and modify body mechanics/ergonomics, assess and modify postural abnormalities, and instruct in home and community integration to attain remaining goals.       Progress toward goals / Updated goals:    Good Progress to    [] STG    [x] LTG  2 as shown by decreasing pain levels, improving CS AROM toward WNLs       PLAN    [x]  Upgrade activities as tolerated YES Continue plan of care   []  Discharge due to :    []  Other:      Therapist: Ana Maria Kevin PTA    Date: 10/6/2022 Time: 6:25 PM   No future appointments.

## 2022-10-11 ENCOUNTER — HOSPITAL ENCOUNTER (OUTPATIENT)
Dept: PHYSICAL THERAPY | Age: 27
Discharge: HOME | End: 2022-10-11
Payer: COMMERCIAL

## 2022-10-11 PROCEDURE — 97112 NEUROMUSCULAR REEDUCATION: CPT

## 2022-10-11 PROCEDURE — 97014 ELECTRIC STIMULATION THERAPY: CPT

## 2022-10-11 PROCEDURE — 97140 MANUAL THERAPY 1/> REGIONS: CPT

## 2022-10-11 PROCEDURE — 97110 THERAPEUTIC EXERCISES: CPT

## 2022-10-11 NOTE — PROGRESS NOTES
PHYSICAL THERAPY - DAILY TREATMENT NOTE     Patient Name: Alia Gandara        Date: 10/11/2022  : 1995   YES Patient  Verified  Visit #:     Insurance: Payor: Kim Danielle / Plan: [place] [employer] P[place] / Product Type: PPO /      In time: 5:15  Out time: 6:25 pm   Total Treatment Time: 70     Medicare/[facility] Time Tracking (below)   Total Timed Codes (min):  - 1:1 Treatment Time:  -     TREATMENT AREA =  Upper back pain [M54.9]    SUBJECTIVE    Pain Level (on 0 to 10 scale):  1  / 10   Medication Changes/New allergies or changes in medical history, any new surgeries or procedures?     NO    If yes, update Summary List   Subjective Functional Status/Changes:  []  No changes reported     Soreness/stiffness persists in CS,  UT, upper TS regions  Pain relief ~ 1 hour after PT  Intermittent mild HA 2-3x per wk       OBJECTIVE  Modalities Rationale:     decrease edema, decrease inflammation, decrease pain, and increase tissue extensibility to improve patient's ability to perform ADLs with less pain  10 min [x] Estim, type/location: [facility] CS, [place]                                     []  att     [x]  unatt     []  w/US     []  w/ice    [x]  w/heat    min []  Mechanical Traction: type/lbs                   []  pro   []  sup   []  int   []  cont    []  before manual    []  after manual    min []  Ultrasound, settings/location:      min []  Iontophoresis w/ dexamethasone, location:                                               []  take home patch       []  in clinic    min []  Ice     []  Heat    location/position:     min []  Vasopneumatic Device, press/temp:    If using vaso (only need to measure limb vaso being performed on)      pre-treatment girth :       post-treatment girth :       measured at (landmark location) :      min []  Other:    [x] Skin assessment post-treatment (if applicable):    [x]  intact    []  redness- no adverse reaction                  []redness - adverse reaction:      27/17 min billed min Therapeutic Exercise:  [x]  See flow sheet   Rationale:      increase ROM, increase strength, improve coordination, and increase proprioception to improve the patients ability to  perform ADLs with less pain       18 min Neuromuscular Re-ed: [x]  See flow sheet   Rationale:      increase ROM, increase strength, improve coordination, and increase proprioception to improve the patients ability to STM to c/s/ UT; TrPt release to same     15 min Manual Therapy: Technique:       STM to c/s/ UT; TrPt release to same supine and prone   Rationale:      decrease pain, increase ROM, increase tissue extensibility, and decrease trigger points to improve patient's ability to  perform ADLs with less pain  The manual therapy interventions were performed at a separate and distinct time from the therapeutic activities interventions. Billed With/As:   [] TE   [] TA   [] Neuro   [] Self Care Patient Education: [x] Review HEP    [] Progressed/Changed HEP based on:   [] positioning   [] body mechanics   [] transfers   [] heat/ice application    [] other:        Other Objective/Functional Measures: Add wall push ups, resume AROM shoulder flexion < 90 degrees with use of mirror for visual feedback.      Post Treatment Pain Level (on 0 to 10) scale:   0  / 10     ASSESSMENT    Assessment/Changes in Function:     Moderate TTP tenderness noted in R>L Rhomboids, UT   Emphasized use of neutral spine posture in ADLs , HEP, self TrPt release with tennis ball on wall ,  as tolerated for pain managment     []  See Progress Note/Recertification   Patient will continue to benefit from skilled PT services to modify and progress therapeutic interventions, address functional mobility deficits, address ROM deficits, address strength deficits, analyze and address soft tissue restrictions, analyze and cue movement patterns, analyze and modify body mechanics/ergonomics, assess and modify postural abnormalities, and instruct in home and community integration to attain remaining goals.       Progress toward goals / Updated goals:    Good Progress to    [] STG    [x] LTG  1-3 as shown by decreasing pain levels, improving tolerance to exercises with decreased fatigue       PLAN    [x]  Upgrade activities as tolerated YES Continue plan of care   []  Discharge due to :    []  Other:      Therapist: Luana Nash PTA    Date: 10/11/2022 Time: 6:25 PM     Future Appointments   Date Time Provider John Cope   10/13/2022  5:20 PM Jerry Shirley PTA [facility]   10/18/2022  5:20 PM Jerry Shirley PTA [facility]   10/20/2022  5:20 PM Jerry Shirley PTA [facility]

## 2022-10-13 ENCOUNTER — HOSPITAL ENCOUNTER (OUTPATIENT)
Dept: PHYSICAL THERAPY | Age: 27
Discharge: HOME | End: 2022-10-13
Payer: COMMERCIAL

## 2022-10-13 PROCEDURE — 97014 ELECTRIC STIMULATION THERAPY: CPT

## 2022-10-13 PROCEDURE — 97110 THERAPEUTIC EXERCISES: CPT

## 2022-10-13 PROCEDURE — 97140 MANUAL THERAPY 1/> REGIONS: CPT

## 2022-10-13 NOTE — PROGRESS NOTES
PHYSICAL THERAPY - DAILY TREATMENT NOTE     Patient Name: Madyson Agustin        Date: 10/13/2022  : 1995   YES Patient  Verified  Visit #:     Insurance: Payor: Tk Tippah / Plan: Shahla Fajardo PPO / Product Type: PPO /      In time: 5:20 pm Out time: 6:26    Total Treatment Time: 61     Medicare/[facility] Time Tracking (below)   Total Timed Codes (min):  - 1:1 Treatment Time:  -     TREATMENT AREA =  Upper back pain [M54.9]    SUBJECTIVE    Pain Level (on 0 to 10 scale):  1  / 10   Medication Changes/New allergies or changes in medical history, any new surgeries or procedures? NO    If yes, update Summary List   Subjective Functional Status/Changes:  []  No changes reported     I didn't have too much pain.   The feeling is there but not too painful         OBJECTIVE  Modalities Rationale:     decrease edema, decrease inflammation, decrease pain, and increase tissue extensibility to improve patient's ability to perform ADLs with less pain  10 min [x] Estim, type/location: Supine CS, UT                                     []  att     [x]  unatt     []  w/US     []  w/ice    [x]  w/heat    min []  Mechanical Traction: type/lbs                   []  pro   []  sup   []  int   []  cont    []  before manual    []  after manual    min []  Ultrasound, settings/location:      min []  Iontophoresis w/ dexamethasone, location:                                               []  take home patch       []  in clinic    min []  Ice     []  Heat    location/position:     min []  Vasopneumatic Device, press/temp:    If using vaso (only need to measure limb vaso being performed on)      pre-treatment girth :       post-treatment girth :       measured at (landmark location) :      min []  Other:    [x] Skin assessment post-treatment (if applicable):    [x]  intact    []  redness- no adverse reaction                  []redness - adverse reaction:      27/ 8 min billed0 min Therapeutic Exercise:  [x]  See flow sheet Rationale:      increase ROM, increase strength, improve coordination, and increase proprioception to improve the patients ability to perform ADLs with less pain     12 min Therapeutic Activity: [x]  See flow sheet   Rationale:      increase ROM, increase strength, improve coordination, and increase proprioception to improve the patients ability to perform ADLs with less pain         12 min Manual Therapy: Technique:      STM to c/s/ UT; TrPt release to same supine and prone   Rationale:      decrease pain, increase ROM, increase tissue extensibility, and decrease trigger points to improve patient's ability to improve tissue mobility for ADLs  The manual therapy interventions were performed at a separate and distinct time from the therapeutic activities interventions. Billed With/As:   [] TE   [] TA   [] Neuro   [] Self Care Patient Education: [x] Review HEP    [] Progressed/Changed HEP based on:   [] positioning   [] body mechanics   [] transfers   [] heat/ice application    [] other:        Other Objective/Functional Measures:    Emphasized neutral spine posture in ADLs and with exercise     Post Treatment Pain Level (on 0 to 10) scale:   0  / 10     ASSESSMENT    Assessment/Changes in Function:     Discussed discharge planning and self progression of HEP as tolerated. []  See Progress Note/Recertification   Patient will continue to benefit from skilled PT services to modify and progress therapeutic interventions, address functional mobility deficits, address ROM deficits, address strength deficits, analyze and address soft tissue restrictions, analyze and cue movement patterns, analyze and modify body mechanics/ergonomics, assess and modify postural abnormalities, and instruct in home and community integration to attain remaining goals.       Progress toward goals / Updated goals:    Good Progress to    [] STG    [] LTG  1 -2 as shown by decreasing fatigue with exercise       PLAN    [x]  Upgrade activities as tolerated YES Continue plan of care   []  Discharge due to :    []  Other:      Therapist: Christina West PTA    Date: 10/13/2022 Time:  6:26 PM     Future Appointments   Date Time Provider John Cope   10/18/2022  5:20 PM Darlys Marking, [place] [id number] Jillian Hernandez   10/20/2022  5:20 PM Clem Graham, [place] [id number] Jill[facility] Hernandez

## 2022-10-18 ENCOUNTER — HOSPITAL ENCOUNTER (OUTPATIENT)
Dept: PHYSICAL THERAPY | Age: 27
Discharge: HOME | End: 2022-10-18
Payer: COMMERCIAL

## 2022-10-18 PROCEDURE — 97140 MANUAL THERAPY 1/> REGIONS: CPT

## 2022-10-18 PROCEDURE — 97014 ELECTRIC STIMULATION THERAPY: CPT

## 2022-10-18 PROCEDURE — 97110 THERAPEUTIC EXERCISES: CPT

## 2022-10-18 NOTE — PROGRESS NOTES
PHYSICAL THERAPY - DAILY TREATMENT NOTE     Patient Name: Anuj Lion        Date: 10/18/2022  : 1995   YES Patient  Verified  Visit #:     Insurance: Payor: Ramses Syed / Plan: [place] [employer] [place] / Product Type: PPO /      In time: 5:24 pm Out time: 6:32    Total Treatment Time: 68     Medicare/[facility] Time Tracking (below)   Total Timed Codes (min):  - 1:1 Treatment Time:  -     TREATMENT AREA =  Upper back pain [M54.9]    SUBJECTIVE    Pain Level (on 0 to 10 scale):  1  / 10   Medication Changes/New allergies or changes in medical history, any new surgeries or procedures? NO    If yes, update Summary List   Subjective Functional Status/Changes:  []  No changes reported     Pt reports mild fatigue with the exercise. Pain has been better.  Pt returned to light gym workout  Performing HEP with less pain   Main compliant intermittent pain in R CS, UT after laying down eased with use of self TrPt release     OBJECTIVE  Modalities Rationale:     decrease edema, decrease inflammation, decrease pain, and increase tissue extensibility to improve patient's ability to perform functional ADLs  with less pain   10 min [x] Estim, type/location: CS, UT IFC                                     []  att     [x]  unatt     []  w/US     []  w/ice    [x]  w/heat    min []  Mechanical Traction: type/lbs                   []  pro   []  sup   []  int   []  cont    []  before manual    []  after manual    min []  Ultrasound, settings/location:      min []  Iontophoresis w/ dexamethasone, location:                                               []  take home patch       []  in clinic    min []  Ice     []  Heat    location/position:     min []  Vasopneumatic Device, press/temp:    If using vaso (only need to measure limb vaso being performed on)      pre-treatment girth :       post-treatment girth :       measured at (landmark location) :      min []  Other:    [x] Skin assessment post-treatment (if applicable):    [x] intact    []  redness- no adverse reaction                  []redness - adverse reaction:      32/10 min billed min Therapeutic Exercise:  [x]  See flow sheet   Rationale:      increase ROM, increase strength, improve coordination, and increase proprioception to improve the patients ability to perform functional ADLs  with less pain          12/NB min Neuromuscular Re-ed: [x]  See flow sheet   Rationale:      increase ROM, increase strength, improve coordination, and increase proprioception to improve the patients ability to perform functional ADLs  with less pain      14 min Manual Therapy: Technique:      STM to c/s/ UT; TrPt release to same supine and prone   Rationale:      decrease pain, increase ROM, increase tissue extensibility, and decrease trigger points to improve patient's ability to perform functional ADLs  with less pain   The manual therapy interventions were performed at a separate and distinct time from the therapeutic activities interventions.           Billed With/As:   [] TE   [] TA   [] Neuro   [] Self Care Patient Education: [x] Review HEP    [] Progressed/Changed HEP based on:   [] positioning   [] body mechanics   [] transfers   [] heat/ice application    [] other:        Other Objective/Functional Measures:    Advanced strengthening exercise per flow sheet     Post Treatment Pain Level (on 0 to 10) scale:   0  / 10     ASSESSMENT    Assessment/Changes in Function:     Discussed discharge planning  TTP tenderness persists R CS paraspinals  Mild fatigue with exercise advancedment     []  See Progress Note/Recertification   Patient will continue to benefit from skilled PT services to modify and progress therapeutic interventions, address functional mobility deficits, address ROM deficits, address strength deficits, analyze and address soft tissue restrictions, analyze and cue movement patterns, analyze and modify body mechanics/ergonomics, assess and modify postural abnormalities, and instruct in home and community integration to attain remaining goals.       Progress toward goals / Updated goals:  Reassess for possible discharge to [facility]       PLAN    [x]  Upgrade activities as tolerated YES Continue plan of care   []  Discharge due to :    []  Other:      Therapist: Verita Lombard, PTA    Date: 10/18/2022 Time:  6:32 PM     Future Appointments   Date Time Provider John Cope   10/20/2022  5:20 PM Bettye Ramsey PTA [facility]

## 2022-10-20 ENCOUNTER — HOSPITAL ENCOUNTER (OUTPATIENT)
Dept: PHYSICAL THERAPY | Age: 27
Discharge: HOME | End: 2022-10-20
Payer: COMMERCIAL

## 2022-10-20 PROCEDURE — 97140 MANUAL THERAPY 1/> REGIONS: CPT

## 2022-10-20 PROCEDURE — 97110 THERAPEUTIC EXERCISES: CPT

## 2022-10-20 PROCEDURE — 97112 NEUROMUSCULAR REEDUCATION: CPT

## 2022-10-20 NOTE — PROGRESS NOTES
[address] [place] [address] PHYSICAL THERAPY AT [facility] [address], [address]  Phone: [phone]  Fax: [phone] SUMMARY  Patient Name: Oscar Booker : 1995   Treatment/Medical Diagnosis: Upper back pain [M54.9]   Referral Source: Dannymachelle Sussy, *     Date of Initial Visit: 2022 Attended Visits: 20 Missed Visits: 2     SUMMARY OF TREATMENT  Physical Therapy treatment has consisted of therapeutic exercise, therapeutic activities, and modalities to improve strength, ROM, mobility, decrease pain in ADLs, and to improve overall function in ADLs. CURRENT STATUS  Pain range: 0 to 1/10; pt reporting ~ 70% improvement in sx   Functional improvements: performing HEP daily  Functional impairments: mild tension with full rotation of neck with left rotation for looking behind for driving   Pt is independent in his D/C HEP. Goal/Measure of Progress Goal Met? 1. The patient will be independent in HEP for long-term management of symptoms   Status at last Eval: Not ind, still progressing Current Status: Ind with D/C HEP. yes   2. Improve functional ability as evidenced by a score of > or = 76/100 on FOTO   Status at last Eval: 61 Current Status: 86 yes   3. No/minimal tenderness and/or hypertonicity to palpation. Status at last Eval: TTP Current Status: No/minimal tenderness    yes     RECOMMENDATIONS  Discontinue therapy. Progressing towards or have reached established goals. If you have any questions/comments please contact us directly at [phone]. Thank you for allowing us to assist in the care of your patient.     Therapist Signature: Tamela Chacon PTA Date: 10/20/2022   Reporting Period:   N/a Time: 5:47 PM

## 2022-10-20 NOTE — PROGRESS NOTES
PHYSICAL THERAPY - DAILY TREATMENT NOTE     Patient Name: Samantha Miranda        Date: 10/20/2022  : 1995   YES Patient  Verified  Visit #:     Insurance: Payor: Emily Erp / Plan: [place] [employer] [place] / Product Type: PPO /      In time: 5:28 pm Out time: 6:25 pm   Total Treatment Time: 63     Medicare/[facility] Time Tracking (below)   Total Timed Codes (min):  - 1:1 Treatment Time:  -     TREATMENT AREA =  Upper back pain [M54.9]    SUBJECTIVE    Pain Level (on 0 to 10 scale):  1  / 10   Medication Changes/New allergies or changes in medical history, any new surgeries or procedures?     NO    If yes, update Summary List   Subjective Functional Status/Changes:  []  No changes reported     Pain range: 0 to 1/10; pt reporting ~ 70% improvement in sx   Functional improvements: performing HEP daily  Functional impairments: strain with full extension of neck         OBJECTIVE  Modalities Rationale:     decrease edema, decrease inflammation, decrease pain, and increase tissue extensibility to improve patient's ability to perform ADLs with less pain   min [] Estim, type/location:                                      []  att     []  unatt     []  w/US     []  w/ice    []  w/heat    min []  Mechanical Traction: type/lbs                   []  pro   []  sup   []  int   []  cont    []  before manual    []  after manual    min []  Ultrasound, settings/location:      min []  Iontophoresis w/ dexamethasone, location:                                               []  take home patch       []  in clinic   10 min []  Ice     [x]  Heat    location/position: Supine CS    min []  Vasopneumatic Device, press/temp:    If using vaso (only need to measure limb vaso being performed on)      pre-treatment girth :       post-treatment girth :       measured at (landmark location) :      min []  Other:    [x] Skin assessment post-treatment (if applicable):    [x]  intact    []  redness- no adverse reaction                  []redness - adverse reaction:      20 min Therapeutic Exercise:  [x]  See flow sheet   Rationale:      increase ROM, increase strength, improve coordination, and increase proprioception to improve the patients ability to perform ADLs with less pain     15 min Therapeutic Activity: [x]  See flow sheet   Rationale:      \   to improve the patients ability to perform ADLs with less pain     13 min Neuromuscular Re-ed: []  See flow sheet   Rationale:      increase ROM, increase strength, improve coordination, and increase proprioception to improve the patients ability to perform ADLs with less pain     15 min Manual Therapy: Technique:      STM to c/s/ UT; TrPt release to same supine; gentle SOR   Rationale:      decrease pain, increase ROM, increase tissue extensibility, and decrease trigger points to improve patient's ability to perform ADLs with less pain  The manual therapy interventions were performed at a separate and distinct time from the therapeutic activities interventions. Billed With/As:   [] TE   [] TA   [] Neuro   [] Self Care Patient Education: [x] Review HEP  , Review STGs/LTGs  [] Progressed/Changed HEP based on:   [] positioning   [] body mechanics   [] transfers   [] heat/ice application    [] other:        Other Objective/Functional Measures: FOTO: 80  GROC: +5 a good deal better     Post Treatment Pain Level (on 0 to 10) scale:   0  / 10     ASSESSMENT    Assessment/Changes in Function:     See discharge     [x]  See Progress Note/Recertification   Patient will continue to benefit from skilled PT services to modify and progress therapeutic interventions, address functional mobility deficits, address ROM deficits, address strength deficits, analyze and address soft tissue restrictions, analyze and cue movement patterns, analyze and modify body mechanics/ergonomics, and instruct in home and community integration to attain remaining goals.       Progress toward goals / Updated goals:  See discharge PLAN    []  Upgrade activities as tolerated NO Continue plan of care   [x]  Discharge due to : Progressing toward goals/met goals   []  Other:      Therapist: Sima Hernandez PTA    Date: 10/20/2022 Time: 6:25  PM   No future appointments.